# Patient Record
Sex: FEMALE | Race: WHITE | Employment: UNEMPLOYED | URBAN - METROPOLITAN AREA
[De-identification: names, ages, dates, MRNs, and addresses within clinical notes are randomized per-mention and may not be internally consistent; named-entity substitution may affect disease eponyms.]

---

## 2018-10-26 ENCOUNTER — HOSPITAL ENCOUNTER (OUTPATIENT)
Dept: MRI IMAGING | Age: 68
Discharge: HOME | End: 2018-10-26
Attending: ORTHOPAEDIC SURGERY
Payer: MEDICARE

## 2018-10-26 ENCOUNTER — HOSPITAL ENCOUNTER (OUTPATIENT)
Dept: GENERAL RADIOLOGY | Age: 68
Discharge: HOME | End: 2018-10-26
Attending: ORTHOPAEDIC SURGERY
Payer: MEDICARE

## 2018-10-26 DIAGNOSIS — M25.561 RIGHT KNEE PAIN: ICD-10-CM

## 2018-10-26 PROCEDURE — 73721 MRI JNT OF LWR EXTRE W/O DYE: CPT

## 2018-10-26 PROCEDURE — 73501 X-RAY EXAM HIP UNI 1 VIEW: CPT

## 2018-10-26 PROCEDURE — 73600 X-RAY EXAM OF ANKLE: CPT

## 2018-10-26 PROCEDURE — 73560 X-RAY EXAM OF KNEE 1 OR 2: CPT

## 2018-11-13 ENCOUNTER — HOSPITAL ENCOUNTER (OUTPATIENT)
Dept: PREADMISSION TESTING | Age: 68
Discharge: HOME | End: 2018-11-13
Payer: MEDICARE

## 2018-11-13 VITALS — HEIGHT: 64 IN | WEIGHT: 165 LBS | BODY MASS INDEX: 28.17 KG/M2

## 2018-11-13 LAB
ATRIAL RATE: 71 BPM
BACTERIA SPEC CULT: NORMAL
CALCULATED P AXIS, ECG09: 22 DEGREES
CALCULATED R AXIS, ECG10: 25 DEGREES
CALCULATED T AXIS, ECG11: 51 DEGREES
DIAGNOSIS, 93000: NORMAL
ERYTHROCYTE [DISTWIDTH] IN BLOOD BY AUTOMATED COUNT: 12.7 % (ref 11.6–14.5)
HCT VFR BLD AUTO: 37.3 % (ref 35–45)
HGB BLD-MCNC: 12.5 G/DL (ref 12–16)
MCH RBC QN AUTO: 32.8 PG (ref 24–34)
MCHC RBC AUTO-ENTMCNC: 33.5 G/DL (ref 31–37)
MCV RBC AUTO: 97.9 FL (ref 74–97)
P-R INTERVAL, ECG05: 158 MS
PLATELET # BLD AUTO: 180 K/UL (ref 135–420)
PMV BLD AUTO: 9.6 FL (ref 9.2–11.8)
Q-T INTERVAL, ECG07: 414 MS
QRS DURATION, ECG06: 74 MS
QTC CALCULATION (BEZET), ECG08: 449 MS
RBC # BLD AUTO: 3.81 M/UL (ref 4.2–5.3)
SERVICE CMNT-IMP: NORMAL
VENTRICULAR RATE, ECG03: 71 BPM
WBC # BLD AUTO: 5.6 K/UL (ref 4.6–13.2)

## 2018-11-13 PROCEDURE — 85027 COMPLETE CBC AUTOMATED: CPT

## 2018-11-13 PROCEDURE — 93005 ELECTROCARDIOGRAM TRACING: CPT

## 2018-11-13 PROCEDURE — 87641 MR-STAPH DNA AMP PROBE: CPT

## 2018-11-13 RX ORDER — ACETAMINOPHEN 500 MG
1000 TABLET ORAL 4 TIMES DAILY
COMMUNITY
End: 2018-11-28

## 2018-11-13 NOTE — PERIOP NOTES
No sleep apnea, removable prosthetic devices or family history of malignant hyperthermia. Care fusion kit and instructions given and reviewed. PCP is aware of the surgery. No participation in clinical trial or research study. Meets criteria for special population- HX DVT. Posting notified. Unable to attend Joint Replacement class- takes care of  who cannot be left alone.

## 2018-11-14 PROBLEM — M17.11 PRIMARY OSTEOARTHRITIS OF RIGHT KNEE: Status: ACTIVE | Noted: 2018-11-14

## 2018-11-14 RX ORDER — CEFAZOLIN SODIUM/WATER 2 G/20 ML
2 SYRINGE (ML) INTRAVENOUS ONCE
Status: CANCELLED | OUTPATIENT
Start: 2018-11-14 | End: 2018-11-14

## 2018-11-14 NOTE — H&P
Patient Name:  Elham Navarro YOB: 1950 Chief Complaint:  Right-sided knee pain. History of Chief Complaint:  Ms. Isra De Paz is a 76 y.o female being seen for right-sided knee pain. She underwent a corticosteroid injection on 06/18/18. She states that the injection seemed to help for a few days only. She is now having more right knee pain and locking. She is also having left knee discomfort. She states her right knee grabs at times and tries to lock up when she is walking. It does not matter whether she is walking at a fast pace or slowly. She states she almost falls at times due to the locking in her right knee. She has difficulty climbing the stairs due to her knee pain. She is unable to lead with her right leg when climbing the stairs due to the pain. She says the right knee is still giving her problems, and she is still having pain. She has difficulty with standing, walking, turning, and twisting. She says she has been cautious. She has had Orthovisc injections and is really not making any progress. Past Medical/Surgical History:   
Disease/Disorder Type Date Side Surgery Date Side Comment Osteoarthritis High cholesterol Depression Finger surgery  right Blood clots Anxiety Arthroscopy shoulder 09/01/2010 right Arthroscopy shoulder 09/17/2012 left Tonsillectomy 1963 Spinal fusion, cervical 1980  CELIA 09/13/2016 -C6-7 Hysterectomy 1999 Trigger finger surgery 2006 left Allergies:   
Ingredient Reaction Medication Name Comment NO KNOWN ALLERGIES Current Medications:   
Medication Directions Aleve 220 mg capsule take 440mg orally as needed Lipitor 40 mg tablet take 40mg orally daily  
lorazepam 1 mg tablet take 1 tablet by oral route  as needed Tylenol Arthritis Pain 650 mg tablet,extended release  as needed  
sertraline 100 mg tablet take 1 tablet by oral route 2 times daily Voltaren 1 % topical gel apply (2G) for small joint and (4G) large joint by topical route 4 times every day to the affected area(s)      100gram tubes Social History: SMOKING Status Tobacco Type Units Per Day Yrs Used Never smoker ALCOHOL There is a history of alcohol use. Type: Beer. consumed weekly. Family History:   
Disease Detail Family Member Age Cause of Death Comments Family history of Asthma   N Arthritis Sister  N Bleeding tendencies Brother  N Cancer, unknown Sister  N   
Cardiovascular disease Father  N Hypertension Father  N   
Cardiovascular disease Brother  N Hypertension Brother  N Seizure disorder Brother  N Stroke Father  N Myocardial infarction Father  Y Review of Systems:    Pertinent positives include joint stiffness. Pertinent negatives include fever and fainting. Vitals: 
Date BP Pulse Temp (F) Resp. (per min.) Height (Total in.) Weight (lbs.) BMI  
11/16/2016 166/95        
11/16/2016 190/100 72   64.00  26.95  
09/08/2016     64.50  25.69 Physical Examination:   
General:  The patient appears healthy. Cardiovascular:  Arterial pulses are normal. 
Skin:  The skin is normal appearing with no contusions or wounds noted. Heart- RRR Lungs-CTA garth Abdomen- +BS,soft,nontender Musculoskeletal:  The right knee appears normal and has no effusion. There is patellofemoral crepitus and tenderness around the medial tibial joint line. Otherwise, the knee demonstrates normal movement and no medial or lateral instability. The quadriceps tendon of the leg is not tender on palpation. The knee has no anterior or posterior drawer signs. Results of the Reinier and apprehension tests of the knee are negative. Neurological:  There is no weakness noted in the lower extremities, hips, knees, or ankles. No muscle atrophy is seen.   Reflexes and peripheral nerves are normal.     
 
 Radiograph Examination: Review of her MRI scan of the right knee [facility] 10/29/18 reveals severe patellofemoral osteoarthritis and tibial osteoarthritis. Impression:   Ms. Alexander Aguilar has knee pain and osteoarthritis. Plan:  We discussed treatments for the condition including surgical intervention, the risks, and benefits as well as the different surgical approaches and decision making. We also discussed nonsurgical care for this condition including medications, injections, physical therapy, rehabilitation, activity modification, and brace utilization. At this point, having failed conservative care, she would like to proceed with operative intervention. We will plan for right total knee arthroplasty . The risks and benefits include infection, bleeding, deep venous thrombosis, pulmonary embolism, heart attack, stroke, death, arthrofibrosis, need for manipulation, neurovascular compromise, need for revision surgical intervention, persistent long-term pain, need for chronic pain management, and metallic allergies.

## 2018-11-25 ENCOUNTER — ANESTHESIA EVENT (OUTPATIENT)
Dept: SURGERY | Age: 68
DRG: 470 | End: 2018-11-25
Payer: MEDICARE

## 2018-11-25 NOTE — ANESTHESIA PREPROCEDURE EVALUATION
Anesthetic History Review of Systems / Medical History Patient summary reviewed, nursing notes reviewed and pertinent labs reviewed Pulmonary Within defined limits Neuro/Psych Within defined limits Psychiatric history Cardiovascular Within defined limits Exercise tolerance: >4 METS 
  
GI/Hepatic/Renal 
Within defined limits Endo/Other Arthritis Other Findings Physical Exam 
 
Airway Mallampati: II 
TM Distance: 4 - 6 cm Neck ROM: decreased range of motion Mouth opening: Normal 
 
 Cardiovascular Rhythm: regular Rate: normal 
 
 
 
 Dental 
No notable dental hx Pulmonary Abdominal 
GI exam deferred Other Findings Anesthetic Plan ASA: 2 Anesthesia type: general and regional - femoral single shot Post-op pain plan if not by surgeon: peripheral nerve block single Induction: Intravenous Anesthetic plan and risks discussed with: Patient Risk of a block include nerve injury, bleeding, infection, and failure as the most common ones although they rare.

## 2018-11-26 ENCOUNTER — HOSPITAL ENCOUNTER (INPATIENT)
Age: 68
LOS: 2 days | Discharge: HOME HEALTH | DRG: 470 | End: 2018-11-28
Attending: ORTHOPAEDIC SURGERY | Admitting: ORTHOPAEDIC SURGERY
Payer: MEDICARE

## 2018-11-26 ENCOUNTER — ANESTHESIA (OUTPATIENT)
Dept: SURGERY | Age: 68
DRG: 470 | End: 2018-11-26
Payer: MEDICARE

## 2018-11-26 DIAGNOSIS — M17.11 PRIMARY OSTEOARTHRITIS OF RIGHT KNEE: Primary | ICD-10-CM

## 2018-11-26 LAB
ABO + RH BLD: NORMAL
BLOOD GROUP ANTIBODIES SERPL: NORMAL
SPECIMEN EXP DATE BLD: NORMAL

## 2018-11-26 PROCEDURE — 74011250636 HC RX REV CODE- 250/636

## 2018-11-26 PROCEDURE — 77030016060 HC NDL NRV BLK TELE -A: Performed by: ANESTHESIOLOGY

## 2018-11-26 PROCEDURE — 0SRC0J9 REPLACEMENT OF RIGHT KNEE JOINT WITH SYNTHETIC SUBSTITUTE, CEMENTED, OPEN APPROACH: ICD-10-PCS | Performed by: ORTHOPAEDIC SURGERY

## 2018-11-26 PROCEDURE — L1830 KO IMMOB CANVAS LONG PRE OTS: HCPCS | Performed by: ORTHOPAEDIC SURGERY

## 2018-11-26 PROCEDURE — 86901 BLOOD TYPING SEROLOGIC RH(D): CPT

## 2018-11-26 PROCEDURE — 74011250636 HC RX REV CODE- 250/636: Performed by: ORTHOPAEDIC SURGERY

## 2018-11-26 PROCEDURE — 97162 PT EVAL MOD COMPLEX 30 MIN: CPT

## 2018-11-26 PROCEDURE — 77030038010: Performed by: ORTHOPAEDIC SURGERY

## 2018-11-26 PROCEDURE — 65270000029 HC RM PRIVATE

## 2018-11-26 PROCEDURE — 77030010785: Performed by: ORTHOPAEDIC SURGERY

## 2018-11-26 PROCEDURE — 3E0T3BZ INTRODUCTION OF ANESTHETIC AGENT INTO PERIPHERAL NERVES AND PLEXI, PERCUTANEOUS APPROACH: ICD-10-PCS | Performed by: ANESTHESIOLOGY

## 2018-11-26 PROCEDURE — 77030012406 HC DRN WND PENRS BARD -A: Performed by: ORTHOPAEDIC SURGERY

## 2018-11-26 PROCEDURE — 76942 ECHO GUIDE FOR BIOPSY: CPT | Performed by: ORTHOPAEDIC SURGERY

## 2018-11-26 PROCEDURE — C1713 ANCHOR/SCREW BN/BN,TIS/BN: HCPCS | Performed by: ORTHOPAEDIC SURGERY

## 2018-11-26 PROCEDURE — 77030032489 HC SLV COMPR SCD FT CUF COVD -B: Performed by: ORTHOPAEDIC SURGERY

## 2018-11-26 PROCEDURE — 77030020813 HC INST SCULP CEM KT DISP S&N -B: Performed by: ORTHOPAEDIC SURGERY

## 2018-11-26 PROCEDURE — 77030008462 HC STPLR SKN PROX J&J -A: Performed by: ORTHOPAEDIC SURGERY

## 2018-11-26 PROCEDURE — 76060000034 HC ANESTHESIA 1.5 TO 2 HR: Performed by: ORTHOPAEDIC SURGERY

## 2018-11-26 PROCEDURE — 64447 NJX AA&/STRD FEMORAL NRV IMG: CPT | Performed by: ANESTHESIOLOGY

## 2018-11-26 PROCEDURE — 77030027138 HC INCENT SPIROMETER -A: Performed by: ORTHOPAEDIC SURGERY

## 2018-11-26 PROCEDURE — 77030018836 HC SOL IRR NACL ICUM -A: Performed by: ORTHOPAEDIC SURGERY

## 2018-11-26 PROCEDURE — 77030013708 HC HNDPC SUC IRR PULS STRY –B: Performed by: ORTHOPAEDIC SURGERY

## 2018-11-26 PROCEDURE — 74011250636 HC RX REV CODE- 250/636: Performed by: ANESTHESIOLOGY

## 2018-11-26 PROCEDURE — 74011000250 HC RX REV CODE- 250: Performed by: ORTHOPAEDIC SURGERY

## 2018-11-26 PROCEDURE — 74011250637 HC RX REV CODE- 250/637: Performed by: ANESTHESIOLOGY

## 2018-11-26 PROCEDURE — 77030020268 HC MISC GENERAL SUPPLY: Performed by: ORTHOPAEDIC SURGERY

## 2018-11-26 PROCEDURE — 77030020782 HC GWN BAIR PAWS FLX 3M -B: Performed by: ORTHOPAEDIC SURGERY

## 2018-11-26 PROCEDURE — C1776 JOINT DEVICE (IMPLANTABLE): HCPCS | Performed by: ORTHOPAEDIC SURGERY

## 2018-11-26 PROCEDURE — 36415 COLL VENOUS BLD VENIPUNCTURE: CPT

## 2018-11-26 PROCEDURE — 77030003028 HC SUT VCRL J&J -A: Performed by: ORTHOPAEDIC SURGERY

## 2018-11-26 PROCEDURE — 76210000017 HC OR PH I REC 1.5 TO 2 HR: Performed by: ORTHOPAEDIC SURGERY

## 2018-11-26 PROCEDURE — 77030012893

## 2018-11-26 PROCEDURE — 97530 THERAPEUTIC ACTIVITIES: CPT

## 2018-11-26 PROCEDURE — 74011000250 HC RX REV CODE- 250: Performed by: PHYSICIAN ASSISTANT

## 2018-11-26 PROCEDURE — 74011000258 HC RX REV CODE- 258: Performed by: PHYSICIAN ASSISTANT

## 2018-11-26 PROCEDURE — 97116 GAIT TRAINING THERAPY: CPT

## 2018-11-26 PROCEDURE — 76010000153 HC OR TIME 1.5 TO 2 HR: Performed by: ORTHOPAEDIC SURGERY

## 2018-11-26 PROCEDURE — 77030028925 HC PIN/DRL SET HUM S&N -C: Performed by: ORTHOPAEDIC SURGERY

## 2018-11-26 PROCEDURE — 74011250637 HC RX REV CODE- 250/637: Performed by: PHYSICIAN ASSISTANT

## 2018-11-26 PROCEDURE — 77030000032 HC CUF TRNQT ZIMM -B: Performed by: ORTHOPAEDIC SURGERY

## 2018-11-26 PROCEDURE — 74011250636 HC RX REV CODE- 250/636: Performed by: PHYSICIAN ASSISTANT

## 2018-11-26 PROCEDURE — 74011000250 HC RX REV CODE- 250

## 2018-11-26 DEVICE — GENESIS II RESURFACING PATELLAR                                    PROSTHESIS  32MM
Type: IMPLANTABLE DEVICE | Site: KNEE | Status: FUNCTIONAL
Brand: GENESIS II

## 2018-11-26 DEVICE — GENESIS II OXINIUM FEMORAL SIZE 5 RIGHT
Type: IMPLANTABLE DEVICE | Site: KNEE | Status: FUNCTIONAL
Brand: GENESIS II

## 2018-11-26 DEVICE — LEGION HIGHLY CROSS LINKED                                    POLYETHYLENE DISHED INSERT SIZE 3-4 9MM
Type: IMPLANTABLE DEVICE | Site: KNEE | Status: FUNCTIONAL
Brand: LEGION

## 2018-11-26 DEVICE — CEMENT BNE 40GM FULL DOSE PMMA W/ GENT M VISC RADPQ FAST: Type: IMPLANTABLE DEVICE | Site: KNEE | Status: FUNCTIONAL

## 2018-11-26 DEVICE — GENESIS II NON-POROUS TIBIAL                                    BASEPLATE SIZE 3 RIGHT
Type: IMPLANTABLE DEVICE | Site: KNEE | Status: FUNCTIONAL
Brand: GENESIS II

## 2018-11-26 RX ORDER — MIDAZOLAM HYDROCHLORIDE 1 MG/ML
INJECTION, SOLUTION INTRAMUSCULAR; INTRAVENOUS
Status: COMPLETED | OUTPATIENT
Start: 2018-11-26 | End: 2018-11-26

## 2018-11-26 RX ORDER — ACETAMINOPHEN 325 MG/1
650 TABLET ORAL
Status: DISCONTINUED | OUTPATIENT
Start: 2018-11-26 | End: 2018-11-28 | Stop reason: HOSPADM

## 2018-11-26 RX ORDER — NALOXONE HYDROCHLORIDE 0.4 MG/ML
0.1 INJECTION, SOLUTION INTRAMUSCULAR; INTRAVENOUS; SUBCUTANEOUS AS NEEDED
Status: DISCONTINUED | OUTPATIENT
Start: 2018-11-26 | End: 2018-11-26 | Stop reason: HOSPADM

## 2018-11-26 RX ORDER — ATORVASTATIN CALCIUM 20 MG/1
40 TABLET, FILM COATED ORAL DAILY
Status: DISCONTINUED | OUTPATIENT
Start: 2018-11-27 | End: 2018-11-28 | Stop reason: HOSPADM

## 2018-11-26 RX ORDER — ONDANSETRON 4 MG/1
4 TABLET, ORALLY DISINTEGRATING ORAL
Status: DISCONTINUED | OUTPATIENT
Start: 2018-11-26 | End: 2018-11-28

## 2018-11-26 RX ORDER — LORAZEPAM 1 MG/1
1 TABLET ORAL AS NEEDED
Status: DISCONTINUED | OUTPATIENT
Start: 2018-11-26 | End: 2018-11-28 | Stop reason: HOSPADM

## 2018-11-26 RX ORDER — DEXTROSE 50 % IN WATER (D50W) INTRAVENOUS SYRINGE
25-50 AS NEEDED
Status: DISCONTINUED | OUTPATIENT
Start: 2018-11-26 | End: 2018-11-26 | Stop reason: HOSPADM

## 2018-11-26 RX ORDER — SODIUM CHLORIDE, SODIUM LACTATE, POTASSIUM CHLORIDE, CALCIUM CHLORIDE 600; 310; 30; 20 MG/100ML; MG/100ML; MG/100ML; MG/100ML
1000 INJECTION, SOLUTION INTRAVENOUS CONTINUOUS
Status: DISCONTINUED | OUTPATIENT
Start: 2018-11-26 | End: 2018-11-26 | Stop reason: HOSPADM

## 2018-11-26 RX ORDER — PROPOFOL 10 MG/ML
INJECTION, EMULSION INTRAVENOUS AS NEEDED
Status: DISCONTINUED | OUTPATIENT
Start: 2018-11-26 | End: 2018-11-26 | Stop reason: HOSPADM

## 2018-11-26 RX ORDER — INSULIN LISPRO 100 [IU]/ML
INJECTION, SOLUTION INTRAVENOUS; SUBCUTANEOUS ONCE
Status: DISCONTINUED | OUTPATIENT
Start: 2018-11-26 | End: 2018-11-26 | Stop reason: HOSPADM

## 2018-11-26 RX ORDER — KETOROLAC TROMETHAMINE 30 MG/ML
15 INJECTION, SOLUTION INTRAMUSCULAR; INTRAVENOUS
Status: DISPENSED | OUTPATIENT
Start: 2018-11-26 | End: 2018-11-27

## 2018-11-26 RX ORDER — HYDROMORPHONE HYDROCHLORIDE 2 MG/ML
0.5 INJECTION, SOLUTION INTRAMUSCULAR; INTRAVENOUS; SUBCUTANEOUS
Status: COMPLETED | OUTPATIENT
Start: 2018-11-26 | End: 2018-11-26

## 2018-11-26 RX ORDER — ONDANSETRON 2 MG/ML
INJECTION INTRAMUSCULAR; INTRAVENOUS AS NEEDED
Status: DISCONTINUED | OUTPATIENT
Start: 2018-11-26 | End: 2018-11-26 | Stop reason: HOSPADM

## 2018-11-26 RX ORDER — SODIUM CHLORIDE 0.9 % (FLUSH) 0.9 %
5-10 SYRINGE (ML) INJECTION AS NEEDED
Status: DISCONTINUED | OUTPATIENT
Start: 2018-11-26 | End: 2018-11-28 | Stop reason: HOSPADM

## 2018-11-26 RX ORDER — KETAMINE HYDROCHLORIDE 10 MG/ML
INJECTION, SOLUTION INTRAMUSCULAR; INTRAVENOUS AS NEEDED
Status: DISCONTINUED | OUTPATIENT
Start: 2018-11-26 | End: 2018-11-26 | Stop reason: HOSPADM

## 2018-11-26 RX ORDER — LIDOCAINE HYDROCHLORIDE 20 MG/ML
INJECTION, SOLUTION EPIDURAL; INFILTRATION; INTRACAUDAL; PERINEURAL AS NEEDED
Status: DISCONTINUED | OUTPATIENT
Start: 2018-11-26 | End: 2018-11-26 | Stop reason: HOSPADM

## 2018-11-26 RX ORDER — ZOLPIDEM TARTRATE 5 MG/1
5 TABLET ORAL
Status: DISCONTINUED | OUTPATIENT
Start: 2018-11-26 | End: 2018-11-28 | Stop reason: HOSPADM

## 2018-11-26 RX ORDER — CEFAZOLIN SODIUM/WATER 2 G/20 ML
2 SYRINGE (ML) INTRAVENOUS EVERY 8 HOURS
Status: COMPLETED | OUTPATIENT
Start: 2018-11-26 | End: 2018-11-27

## 2018-11-26 RX ORDER — DOCUSATE SODIUM 100 MG/1
100 CAPSULE, LIQUID FILLED ORAL 2 TIMES DAILY
Status: DISCONTINUED | OUTPATIENT
Start: 2018-11-26 | End: 2018-11-28 | Stop reason: HOSPADM

## 2018-11-26 RX ORDER — FENTANYL CITRATE 50 UG/ML
25 INJECTION, SOLUTION INTRAMUSCULAR; INTRAVENOUS
Status: DISCONTINUED | OUTPATIENT
Start: 2018-11-26 | End: 2018-11-26 | Stop reason: HOSPADM

## 2018-11-26 RX ORDER — SODIUM CHLORIDE, SODIUM LACTATE, POTASSIUM CHLORIDE, CALCIUM CHLORIDE 600; 310; 30; 20 MG/100ML; MG/100ML; MG/100ML; MG/100ML
125 INJECTION, SOLUTION INTRAVENOUS CONTINUOUS
Status: DISCONTINUED | OUTPATIENT
Start: 2018-11-26 | End: 2018-11-28 | Stop reason: HOSPADM

## 2018-11-26 RX ORDER — CELECOXIB 100 MG/1
200 CAPSULE ORAL ONCE
Status: COMPLETED | OUTPATIENT
Start: 2018-11-26 | End: 2018-11-26

## 2018-11-26 RX ORDER — FLUMAZENIL 0.1 MG/ML
0.2 INJECTION INTRAVENOUS
Status: DISCONTINUED | OUTPATIENT
Start: 2018-11-26 | End: 2018-11-26 | Stop reason: HOSPADM

## 2018-11-26 RX ORDER — SODIUM CHLORIDE 0.9 % (FLUSH) 0.9 %
5-10 SYRINGE (ML) INJECTION AS NEEDED
Status: DISCONTINUED | OUTPATIENT
Start: 2018-11-26 | End: 2018-11-26 | Stop reason: HOSPADM

## 2018-11-26 RX ORDER — SERTRALINE HYDROCHLORIDE 50 MG/1
150 TABLET, FILM COATED ORAL DAILY
Status: DISCONTINUED | OUTPATIENT
Start: 2018-11-27 | End: 2018-11-28 | Stop reason: HOSPADM

## 2018-11-26 RX ORDER — FENTANYL CITRATE 50 UG/ML
INJECTION, SOLUTION INTRAMUSCULAR; INTRAVENOUS AS NEEDED
Status: DISCONTINUED | OUTPATIENT
Start: 2018-11-26 | End: 2018-11-26 | Stop reason: HOSPADM

## 2018-11-26 RX ORDER — MAGNESIUM SULFATE 100 %
4 CRYSTALS MISCELLANEOUS AS NEEDED
Status: DISCONTINUED | OUTPATIENT
Start: 2018-11-26 | End: 2018-11-26 | Stop reason: HOSPADM

## 2018-11-26 RX ORDER — DIPHENHYDRAMINE HCL 25 MG
25 CAPSULE ORAL
Status: DISCONTINUED | OUTPATIENT
Start: 2018-11-26 | End: 2018-11-28 | Stop reason: HOSPADM

## 2018-11-26 RX ORDER — LANOLIN ALCOHOL/MO/W.PET/CERES
1 CREAM (GRAM) TOPICAL 2 TIMES DAILY WITH MEALS
Status: DISCONTINUED | OUTPATIENT
Start: 2018-11-26 | End: 2018-11-28 | Stop reason: HOSPADM

## 2018-11-26 RX ORDER — CEFAZOLIN SODIUM/WATER 2 G/20 ML
2 SYRINGE (ML) INTRAVENOUS ONCE
Status: COMPLETED | OUTPATIENT
Start: 2018-11-26 | End: 2018-11-26

## 2018-11-26 RX ORDER — FENTANYL CITRATE 50 UG/ML
INJECTION, SOLUTION INTRAMUSCULAR; INTRAVENOUS
Status: DISPENSED
Start: 2018-11-26 | End: 2018-11-26

## 2018-11-26 RX ORDER — ROPIVACAINE HYDROCHLORIDE 5 MG/ML
INJECTION, SOLUTION EPIDURAL; INFILTRATION; PERINEURAL
Status: COMPLETED | OUTPATIENT
Start: 2018-11-26 | End: 2018-11-26

## 2018-11-26 RX ORDER — OXYCODONE AND ACETAMINOPHEN 10; 325 MG/1; MG/1
1 TABLET ORAL
Status: DISCONTINUED | OUTPATIENT
Start: 2018-11-26 | End: 2018-11-27

## 2018-11-26 RX ORDER — OXYCODONE AND ACETAMINOPHEN 5; 325 MG/1; MG/1
1 TABLET ORAL
Status: DISCONTINUED | OUTPATIENT
Start: 2018-11-26 | End: 2018-11-27

## 2018-11-26 RX ORDER — LUBIPROSTONE 24 UG/1
24 CAPSULE, GELATIN COATED ORAL 2 TIMES DAILY WITH MEALS
Status: DISCONTINUED | OUTPATIENT
Start: 2018-11-26 | End: 2018-11-28 | Stop reason: HOSPADM

## 2018-11-26 RX ORDER — DEXTROSE, SODIUM CHLORIDE, SODIUM LACTATE, POTASSIUM CHLORIDE, AND CALCIUM CHLORIDE 5; .6; .31; .03; .02 G/100ML; G/100ML; G/100ML; G/100ML; G/100ML
125 INJECTION, SOLUTION INTRAVENOUS CONTINUOUS
Status: DISPENSED | OUTPATIENT
Start: 2018-11-26 | End: 2018-11-28

## 2018-11-26 RX ORDER — ACETAMINOPHEN 10 MG/ML
1000 INJECTION, SOLUTION INTRAVENOUS ONCE
Status: COMPLETED | OUTPATIENT
Start: 2018-11-26 | End: 2018-11-26

## 2018-11-26 RX ORDER — ENOXAPARIN SODIUM 100 MG/ML
30 INJECTION SUBCUTANEOUS EVERY 12 HOURS
Status: DISCONTINUED | OUTPATIENT
Start: 2018-11-27 | End: 2018-11-28 | Stop reason: HOSPADM

## 2018-11-26 RX ORDER — SODIUM CHLORIDE 0.9 % (FLUSH) 0.9 %
5-10 SYRINGE (ML) INJECTION EVERY 8 HOURS
Status: DISCONTINUED | OUTPATIENT
Start: 2018-11-26 | End: 2018-11-28 | Stop reason: HOSPADM

## 2018-11-26 RX ORDER — DEXAMETHASONE SODIUM PHOSPHATE 4 MG/ML
INJECTION, SOLUTION INTRA-ARTICULAR; INTRALESIONAL; INTRAMUSCULAR; INTRAVENOUS; SOFT TISSUE AS NEEDED
Status: DISCONTINUED | OUTPATIENT
Start: 2018-11-26 | End: 2018-11-26 | Stop reason: HOSPADM

## 2018-11-26 RX ORDER — NALOXONE HYDROCHLORIDE 0.4 MG/ML
0.4 INJECTION, SOLUTION INTRAMUSCULAR; INTRAVENOUS; SUBCUTANEOUS AS NEEDED
Status: DISCONTINUED | OUTPATIENT
Start: 2018-11-26 | End: 2018-11-28 | Stop reason: HOSPADM

## 2018-11-26 RX ADMIN — FERROUS SULFATE TAB 325 MG (65 MG ELEMENTAL FE) 325 MG: 325 (65 FE) TAB at 16:07

## 2018-11-26 RX ADMIN — SODIUM CHLORIDE, SODIUM LACTATE, POTASSIUM CHLORIDE, AND CALCIUM CHLORIDE: 600; 310; 30; 20 INJECTION, SOLUTION INTRAVENOUS at 09:00

## 2018-11-26 RX ADMIN — SODIUM CHLORIDE, SODIUM LACTATE, POTASSIUM CHLORIDE, CALCIUM CHLORIDE, AND DEXTROSE MONOHYDRATE 125 ML/HR: 600; 310; 30; 20; 5 INJECTION, SOLUTION INTRAVENOUS at 12:15

## 2018-11-26 RX ADMIN — HYDROMORPHONE HYDROCHLORIDE 0.5 MG: 2 INJECTION INTRAMUSCULAR; INTRAVENOUS; SUBCUTANEOUS at 10:26

## 2018-11-26 RX ADMIN — KETAMINE HYDROCHLORIDE 10 MG: 10 INJECTION, SOLUTION INTRAMUSCULAR; INTRAVENOUS at 08:40

## 2018-11-26 RX ADMIN — Medication 2 G: at 08:43

## 2018-11-26 RX ADMIN — Medication 2 G: at 16:07

## 2018-11-26 RX ADMIN — OXYCODONE AND ACETAMINOPHEN 1 TABLET: 10; 325 TABLET ORAL at 16:06

## 2018-11-26 RX ADMIN — FENTANYL CITRATE 25 MCG: 50 INJECTION, SOLUTION INTRAMUSCULAR; INTRAVENOUS at 09:00

## 2018-11-26 RX ADMIN — ONDANSETRON 4 MG: 2 INJECTION INTRAMUSCULAR; INTRAVENOUS at 08:15

## 2018-11-26 RX ADMIN — SODIUM CHLORIDE, SODIUM LACTATE, POTASSIUM CHLORIDE, AND CALCIUM CHLORIDE 125 ML/HR: 600; 310; 30; 20 INJECTION, SOLUTION INTRAVENOUS at 06:32

## 2018-11-26 RX ADMIN — KETAMINE HYDROCHLORIDE 15 MG: 10 INJECTION, SOLUTION INTRAMUSCULAR; INTRAVENOUS at 08:45

## 2018-11-26 RX ADMIN — PROPOFOL 150 MG: 10 INJECTION, EMULSION INTRAVENOUS at 08:03

## 2018-11-26 RX ADMIN — KETAMINE HYDROCHLORIDE 15 MG: 10 INJECTION, SOLUTION INTRAMUSCULAR; INTRAVENOUS at 08:34

## 2018-11-26 RX ADMIN — TRANEXAMIC ACID 1 G: 100 INJECTION, SOLUTION INTRAVENOUS at 08:19

## 2018-11-26 RX ADMIN — MIDAZOLAM HYDROCHLORIDE 2 MG: 1 INJECTION, SOLUTION INTRAMUSCULAR; INTRAVENOUS at 07:50

## 2018-11-26 RX ADMIN — KETOROLAC TROMETHAMINE 15 MG: 30 INJECTION, SOLUTION INTRAMUSCULAR at 14:16

## 2018-11-26 RX ADMIN — HYDROMORPHONE HYDROCHLORIDE 0.5 MG: 2 INJECTION INTRAMUSCULAR; INTRAVENOUS; SUBCUTANEOUS at 10:44

## 2018-11-26 RX ADMIN — DOCUSATE SODIUM 100 MG: 100 CAPSULE, LIQUID FILLED ORAL at 20:03

## 2018-11-26 RX ADMIN — Medication 10 ML: at 22:48

## 2018-11-26 RX ADMIN — FENTANYL CITRATE 50 MCG: 50 INJECTION, SOLUTION INTRAMUSCULAR; INTRAVENOUS at 08:53

## 2018-11-26 RX ADMIN — LIDOCAINE HYDROCHLORIDE 60 MG: 20 INJECTION, SOLUTION EPIDURAL; INFILTRATION; INTRACAUDAL; PERINEURAL at 08:03

## 2018-11-26 RX ADMIN — KETOROLAC TROMETHAMINE 15 MG: 30 INJECTION, SOLUTION INTRAMUSCULAR at 22:47

## 2018-11-26 RX ADMIN — FENTANYL CITRATE 25 MCG: 50 INJECTION, SOLUTION INTRAMUSCULAR; INTRAVENOUS at 11:24

## 2018-11-26 RX ADMIN — CELECOXIB 200 MG: 100 CAPSULE ORAL at 07:46

## 2018-11-26 RX ADMIN — PROPOFOL 20 MG: 10 INJECTION, EMULSION INTRAVENOUS at 09:30

## 2018-11-26 RX ADMIN — SODIUM CHLORIDE, SODIUM LACTATE, POTASSIUM CHLORIDE, AND CALCIUM CHLORIDE: 600; 310; 30; 20 INJECTION, SOLUTION INTRAVENOUS at 07:59

## 2018-11-26 RX ADMIN — OXYCODONE AND ACETAMINOPHEN 1 TABLET: 10; 325 TABLET ORAL at 12:23

## 2018-11-26 RX ADMIN — PROPOFOL 20 MG: 10 INJECTION, EMULSION INTRAVENOUS at 09:25

## 2018-11-26 RX ADMIN — DOCUSATE SODIUM 100 MG: 100 CAPSULE, LIQUID FILLED ORAL at 14:16

## 2018-11-26 RX ADMIN — PROPOFOL 20 MG: 10 INJECTION, EMULSION INTRAVENOUS at 09:00

## 2018-11-26 RX ADMIN — HYDROMORPHONE HYDROCHLORIDE 0.5 MG: 2 INJECTION INTRAMUSCULAR; INTRAVENOUS; SUBCUTANEOUS at 10:16

## 2018-11-26 RX ADMIN — FENTANYL CITRATE 25 MCG: 50 INJECTION, SOLUTION INTRAMUSCULAR; INTRAVENOUS at 11:17

## 2018-11-26 RX ADMIN — LUBIPROSTONE 24 MCG: 24 CAPSULE, GELATIN COATED ORAL at 16:06

## 2018-11-26 RX ADMIN — ROPIVACAINE HYDROCHLORIDE 20 ML: 5 INJECTION, SOLUTION EPIDURAL; INFILTRATION; PERINEURAL at 07:50

## 2018-11-26 RX ADMIN — HYDROMORPHONE HYDROCHLORIDE 0.5 MG: 2 INJECTION INTRAMUSCULAR; INTRAVENOUS; SUBCUTANEOUS at 10:56

## 2018-11-26 RX ADMIN — DEXAMETHASONE SODIUM PHOSPHATE 4 MG: 4 INJECTION, SOLUTION INTRA-ARTICULAR; INTRALESIONAL; INTRAMUSCULAR; INTRAVENOUS; SOFT TISSUE at 08:15

## 2018-11-26 RX ADMIN — PROPOFOL 50 MG: 10 INJECTION, EMULSION INTRAVENOUS at 08:47

## 2018-11-26 RX ADMIN — ACETAMINOPHEN 1000 MG: 10 INJECTION, SOLUTION INTRAVENOUS at 08:19

## 2018-11-26 RX ADMIN — SODIUM CHLORIDE, SODIUM LACTATE, POTASSIUM CHLORIDE, CALCIUM CHLORIDE, AND DEXTROSE MONOHYDRATE 125 ML/HR: 600; 310; 30; 20; 5 INJECTION, SOLUTION INTRAVENOUS at 20:06

## 2018-11-26 RX ADMIN — FENTANYL CITRATE 25 MCG: 50 INJECTION, SOLUTION INTRAMUSCULAR; INTRAVENOUS at 09:25

## 2018-11-26 RX ADMIN — OXYCODONE AND ACETAMINOPHEN 1 TABLET: 10; 325 TABLET ORAL at 20:03

## 2018-11-26 NOTE — PROGRESS NOTES
Problem: Mobility Impaired (Adult and Pediatric) Goal: *Acute Goals and Plan of Care (Insert Text) In 1-7 days pt will be able to perform: ST.  Bed mobility:  Rolling L to R to L modified independent for positioning. 2.  Supine to sit to supine S with HR for meals. 3.  Sit to stand to sit S with RW in prep for ambulation. LT.  Gait:  Ambulate >150ft S with RW, WBAT, for home/community mobility. 2.  Activity tolerance: Tolerate up in chair 1-2 hours for ADLs. 3.  Patient/Family Education:  Patient/family to be independent with HEP for follow-up care and safe discharge. physical Therapy EVALUATION Patient: Kym Miller (81 y.o. female) Date: 2018 Primary Diagnosis: RIGHT KNEE OSTEOARTHRITIS, RIGHT  KNEE PAIN, ANXIETY, ELEVATED CHOLESTEROL, DEPRESSION Osteoarthritis of right knee Procedure(s) (LRB): 
RIGHT TOTAL KNEE ARTHROPLASTY (Right) Day of Surgery Precautions:   Fall, WBAT 
 
ASSESSMENT : 
Based on the objective data described below, the patient presents with decreased functional mobility and independence in regard to bed mobility, transfers, gt quality and tolerance, R knee AROM, R knee strength, pain, stair negotiation and safety due to recent R TKA surgery. Pt rating pain in R knee 7/10 on numerical pain scale. Pt tearful upon mobility training due to pain. Pt required min A for supine<>sit<>stand. Pt able to participate in gt training w/ RW, WBAT, R KI, GB and min A w/ antalgic pattern. Pt able to void on [facility] and perform own hygiene. Pt returned to supine in bed w/ all needs within reach, SCDs applied and ice pack to R knee. Nurse Zachary King aware and family  
present. Recommend [facility] d/c. Pt will need RW for home. Patient will benefit from skilled intervention to address the above impairments. Patients rehabilitation potential is considered to be Good Factors which may influence rehabilitation potential include:  
[]         None noted []         Mental ability/status []         Medical condition 
[]         Home/family situation and support systems 
[]         Safety awareness [x]         Pain tolerance/management 
[]         Other: PLAN : 
Recommendations and Planned Interventions: 
[x]           Bed Mobility Training             []    Neuromuscular Re-Education 
[x]           Transfer Training                   []    Orthotic/Prosthetic Training 
[x]           Gait Training                          []    Modalities [x]           Therapeutic Exercises          []    Edema Management/Control 
[x]           Therapeutic Activities            [x]    Patient and Family Training/Education 
[]           Other (comment): Frequency/Duration: Patient will be followed by physical therapy 2-3 times per day/4-7 days per week to address goals. Discharge Recommendations: Home Health Further Equipment Recommendations for Discharge: rolling walker SUBJECTIVE:  
Patient stated I am hurting.  OBJECTIVE DATA SUMMARY:  
 
Past Medical History:  
Diagnosis Date  Chronic neck pain  Chronic pain   
 lumbar  Hypercholesterolemia  OA (osteoarthritis)  Psychiatric disorder   
 depression, anxiety  Thromboembolus ([facility] [address] 1990's  
 right leg Past Surgical History:  
Procedure Laterality Date [address] [facility]  HX CERVICAL FUSION    
 C6 & C7 anterior  HX GI    
 colonoscopy X2  
 HX HYSTERECTOMY  2000 [facility]  
 HX MOHS PROCEDURES  2009  
 right  HX ORTHOPAEDIC  2007 Left 4th finger trigger finger release  HX ROTATOR CUFF REPAIR Bilateral   
 HX SALPINGO-OOPHORECTOMY  2000 [facility] [address] [facility] Barriers to Learning/Limitations: None Compensate with: visual, verbal, tactile, kinesthetic cues/model Prior Level of Function/Home Situation:  
Home Situation Home Environment: Private residence # Steps to Enter: 0 One/Two Story Residence: Two story # of Interior Steps: 15 
 Lift Chair Available: Yes Living Alone: No 
Support Systems: Spouse/Significant Other/Partner Patient Expects to be Discharged to[de-identified] Private residence Current DME Used/Available at Home: Lift chairCritical Behavior: 
Neurologic State: Alert; Appropriate for age Orientation Level: Oriented X4 Cognition: Appropriate for age attention/concentration; Appropriate decision making; Appropriate safety awareness; Follows commands Safety/Judgement: Awareness of environment Psychosocial 
Patient Behaviors: Cooperative; Anxious; Tearful Family  Behaviors: Supportive;Calm; Appropriate for situation Purposeful Interaction: Yes Pt Identified Daily Priority: Clinical issues (comment) Caritas Process: Nurture loving kindness Caring Interventions: Reassure Reassure: Therapeutic listeningSkin Condition/Temp: Dry;Warm Family  Behaviors: Supportive;Calm; Appropriate for situation Skin Integrity: Incision (comment)(R knee) Skin Integumentary Skin Color: Appropriate for ethnicity Skin Condition/Temp: Dry;Warm 
Skin Integrity: Incision (comment)(R knee) Turgor: Non-tenting Hair Growth: Present Varicosities: Absent Strength:   
Strength: Generally decreased, functional 
Tone & Sensation:  
Tone: Normal 
Sensation: Intact Range Of Motion: 
AROM: Generally decreased, functional 
Functional Mobility: 
Bed Mobility: 
Supine to Sit: Minimum assistance; Additional time(vc) 
Sit to Supine: Minimum assistance; Additional time(vc) 
Scooting: Minimum assistance; Additional time(vc) 
Transfers: 
Sit to Stand: Minimum assistance; Additional time(vc) 
Stand to Sit: Minimum assistance; Additional time(vc) 
Balance:  
Sitting: Intact Standing: Intact; With supportAmbulation/Gait Training: 
Distance (ft): 4 Feet (ft) Assistive Device: Walker, rolling;Gait belt;Brace/Splint Ambulation - Level of Assistance: Minimal assistance(vc) 
Gait Abnormalities: Antalgic;Decreased step clearance; Step to gait Right Side Weight Bearing: As tolerated Base of Support: Shift to left Stance: Right decreased Speed/Heaven: Slow Step Length: Left shortened;Right shortened Swing Pattern: Left asymmetrical;Right asymmetrical 
Interventions: Safety awareness training; Tactile cues; Verbal cues; Visual/Demos Therapeutic Exercises: HEP written copy issued to pt per MD protocol. Pain: 
Pain Scale 1: Numeric (0 - 10) Pain Intensity 1: 7 Pain Location 1: Knee Pain Orientation 1: Posterior;Right Pain Description 1: Aching Pain Intervention(s) 1: Medication (see MAR) Activity Tolerance:  
Fair Please refer to the flowsheet for vital signs taken during this treatment. After treatment:  
[]         Patient left in no apparent distress sitting up in chair 
[x]         Patient left in no apparent distress in bed 
[x]         Call bell left within reach [x]         Nursing notified 
[]         Caregiver present 
[]         Bed alarm activated COMMUNICATION/EDUCATION:  
[x]         Fall prevention education was provided and the patient/caregiver indicated understanding. [x]         Patient/family have participated as able in goal setting and plan of care. [x]         Patient/family agree to work toward stated goals and plan of care. []         Patient understands intent and goals of therapy, but is neutral about his/her participation. []         Patient is unable to participate in goal setting and plan of care. Thank you for this referral. 
Michael Mathis, PT Time Calculation: 49 mins Eval Complexity: History: HIGH Complexity :3+ comorbidities / personal factors will impact the outcome/ POC Exam:MEDIUM Complexity : 3 Standardized tests and measures addressing body structure, function, activity limitation and / or participation in recreation  Presentation: LOW Complexity : Stable, uncomplicated  Clinical Decision Making:Medium Complexity amb <30' Overall Complexity:LOW  Mobility  Current  CJ= 20-39%   Goal  CI= 1-19%. The severity rating is based on the Level of Assistance required for Functional Mobility and ADLs.

## 2018-11-26 NOTE — PROGRESS NOTES
PT orders received and chart reviewed. Pt too drowsy to participate w/ PT at this time. Will attempt later as pt appropriate and schedule allows.

## 2018-11-26 NOTE — INTERVAL H&P NOTE
H&P Update: 
Devorah Flores was seen and examined. History and physical has been reviewed. The patient has been examined.  There have been no significant clinical changes since the completion of the originally dated History and Physical. 
 
Signed By: Misty Silva MD   
 November 26, 2018 7:00 AM

## 2018-11-26 NOTE — PERIOP NOTES
TRANSFER - OUT REPORT: 
 
Verbal report given to KHADIJAH Gmoez(name) on Delfino Le  being transferred to (unit) for routine post - op Report consisted of patients Situation, Background, Assessment and  
Recommendations(SBAR). Information from the following report(s) SBAR, Kardex, OR Summary, Procedure Summary, Intake/Output and MAR was reviewed with the receiving nurse. Lines:  
Peripheral IV 11/26/18 Right Hand (Active) Site Assessment Clean, dry, & intact 11/26/2018 10:30 AM  
Phlebitis Assessment 0 11/26/2018 10:30 AM  
Infiltration Assessment 0 11/26/2018 10:30 AM  
Dressing Status Clean, dry, & intact 11/26/2018 10:30 AM  
Dressing Type Transparent;Tape 11/26/2018 10:30 AM  
Hub Color/Line Status Green; Infusing 11/26/2018 10:30 AM  
Alcohol Cap Used No 11/26/2018  6:26 AM  
  
 
Opportunity for questions and clarification was provided. Patient transported with: 
 Registered Nurse Tech

## 2018-11-26 NOTE — PROGRESS NOTES
1203 Patient arrives to unit at this time. Dual skin assessment completed with Parul Granados RN, no abnormalities noted besides surgical incision to right knee. Admission assessment completed. Patient is A/O x 4. Lungs clear, radial pulses palpable, pedal pulses palpable, abdomen soft and nontender. Bowel sounds active in all 4 quadrants, 18 G IV to right hand intact and patent D5LR infusing. No signs of phlebitis or infiltration noted. TEDs on left leg and compression devices applied bilateral. Skin warm and dry  with elastic dressing and knee immobilizer to right knee clean dry and intact. Patient denies numbness/tingling & SOB. Pain 10/10 prn pain meds. Patient was oriented to the room to include use of call bell, meal ordering, and use of incentive spirometer, patient able to get incentive spirometer to 2500. Patient was given explanation of \" up for dinner\" program and has verbalized understanding. Phone and call bell left within reach. Non slip socks on. Plan of care for the day addressed with patient. Educated on pain medication availability and possible side effects as well as need to call for assistance before getting out of bed, patient verbalized understanding. Fall band on. Hemovac charged, patent, and draining. 1223 Patient stated pain was 10/10. PRN Percocet 10mg given. Patient educated on side effects and will monitor for relief. 1417 Patient stated pain was 9/10. PRN Tordol 15mg given. Patient educated on side effects and will monitor for relief. 1607 Patient stated pain was 7/10. PRN Percocet 10mg given. Patient educated on side effects and will monitor for relief. 2003 Patient stated pain was 8/10. PRN Percocet 10mg given. Patient educated on side effects and will monitor for relief. 2005 Bedside and Verbal shift change report given to [address]  by Abilio Hernandez RN.  Report included the following information SBAR, Kardex, Intake/Output, MAR and Recent Results. Pt in no distress,call bell within reach, and gripper socks on.

## 2018-11-26 NOTE — PERIOP NOTES
Patient transferred to room 204, [facility], 2 south via bed. NC at 2 LPM. PIV with IVF infusing per orders. Blood pressure (!) 169/107, pulse 66, temperature 98.3 °F (36.8 °C), resp. rate 16, height 5' 3.5\" (1.613 m), weight 73.6 kg (162 lb 5 oz), SpO2 99 %. Shayna LUCIO at bedside.

## 2018-11-26 NOTE — ROUTINE PROCESS
TRANSFER - IN REPORT: 
 
Verbal report received from [facility]) on Justa Frazier  being received from [employer]) for routine post - op Report consisted of patients Situation, Background, Assessment and  
Recommendations(SBAR). Information from the following report(s) SBAR, OR Summary, Procedure Summary, Intake/Output and MAR was reviewed with the receiving nurse. Opportunity for questions and clarification was provided. Assessment completed upon patients arrival to unit and care assumed. RN stated pt respirations were doing better.

## 2018-11-26 NOTE — ANESTHESIA POSTPROCEDURE EVALUATION
Post-Anesthesia Evaluation and Assessment Cardiovascular Function/Vital Signs Visit Vitals /77 Pulse 86 Temp 37.3 °C (99.1 °F) Resp 12 Ht 5' 3.5\" (1.613 m) Wt 73.6 kg (162 lb 5 oz) SpO2 95% BMI 28.30 kg/m² Patient is status post Procedure(s): RIGHT TOTAL KNEE ARTHROPLASTY. Nausea/Vomiting: Controlled. Postoperative hydration reviewed and adequate. Pain: 
Pain Scale 1: Visual (11/26/18 2533) Pain Intensity 1: 0 (11/26/18 3240) Managed. Neurological Status:  
Neuro (WDL): Within Defined Limits (11/26/18 0752) At baseline. Mental Status and Level of Consciousness: Arousable. Pulmonary Status:  
O2 Device: Nasal cannula (11/26/18 1027) Adequate oxygenation and airway patent. Complications related to anesthesia: None Post-anesthesia assessment completed. No concerns. Patient has met all discharge requirements. Signed By: Polo Millan MD  
 November 26, 2018 Procedure(s): RIGHT TOTAL KNEE ARTHROPLASTY. <BSHSIANPOST> Visit Vitals /77 Pulse 86 Temp 37.3 °C (99.1 °F) Resp 12 Ht 5' 3.5\" (1.613 m) Wt 73.6 kg (162 lb 5 oz) SpO2 95% BMI 28.30 kg/m²

## 2018-11-26 NOTE — OP NOTES
OPERATIVE NOTE    Patient: Delfino Le MRN: [id number]  SSN: [id number]    YOB: 1950  Age: 76 y.o. Sex: female      Indications: This is a 76y.o. year-old female who presents with right knee pain. X-rays have revealed significant OA. The patient was admitted for surgery as conservative measures have failed. Date of Procedure: 11/26/2018     Preoperative Diagnosis: RIGHT KNEE OSTEOARTHRITIS, RIGHT  KNEE PAIN, ANXIETY, ELEVATED CHOLESTEROL, DEPRESSION    Postoperative Diagnosis: RIGHT KNEE OSTEOARTHRITIS, RIGHT  KNEE PAIN, ANXIETY, ELEVATED CHOLESTEROL, DEPRESSION      Procedure: Procedure(s):  RIGHT TOTAL KNEE ARTHROPLASTY    Surgeon(s) and Role:     Benja Monroe MD - Primary    Anesthesia: @ORANEST    Estimated Blood Loss: 50cc    Tourniquet Time: 45min    Specimens: * No specimens in log *     Implants:   Implant Name Type Inv. Item Serial No.  Lot No. LRB No. Used Action   CEMENT BNE MED VISCO 40GM --  - [id number]  CEMENT BNE MED VISCO 40GM --   [employer] [id number] Right 2 Implanted   FEM OXIN NP JOSE II 5 RT --  - [id number]  FEM OXIN NP JOSE II 5 RT --   [place] [employer] [id number] Right 1 Implanted   BASEPLT FEM NP 3 RT -- JOSE II - [id number]  BASEPLT FEM NP 3 RT -- JOSE II  [employer] [id number] Right 1 Implanted   INSERT LGN XLPE [facility] SZ3-4 9MM --  - [id number]  INSERT LGN XLPE [facility] SZ3-4 9MM --   [employer] [id number] Right 1 Implanted   PAT BCNVX UHMWPE 32MM -- JOSE II - [id number]  PAT BCNVX UHMWPE 32MM -- JOSE II  [employer] [id number] Right 1 Implanted       Complications: None; patient tolerated the procedure well. Procedure: The patient was greeted by Anesthesia and taken to the operative suite, where the patient underwent general endotracheal anesthesia. Tourniquet was placed on the upper thigh. The leg was sterilely prepped and draped in a standard fashion.   The leg was exsanguinated with an Esmarch bandage and tourniquet was elevated to 350mmHg. An incision was carried out centered over the patella, extending two fingerbreadth's over the patella and to the level of the tibial tubercle. A medial parapatellar approach was utilized. The knee was taken into flexion. The medial and lateral meniscus, as well as the anterior cruciate ligament were resected. The posterior cruciate ligament was preserved. The [employer] custom femoral cutting block was placed, contacting the anterior femoral shaft and over the trochlear groove of the femur. This was an excellent fit. This was subsequently pinned and an oscillating saw was ultilized to create an anterior cut. The femur had previously sized to a size 5. A size 5 four-in-one cutting block was utilized to make the appropriate bone cuts. A size 5 femoral cruciate retaining trial was placed and found to be an excellent fit. The knee was taken into hyperflexion. Retractors were positioned to protect the soft tissue and neurovascular structures. The [employer] tibial cutting block was subsequently placed. There was excellent contact with the medial and lateral proximal tibial surface, as well as the anterior shaft of the tibia. It was subsequently pinned and an oscillating saw was utilized to create a proximal tibial cut. A size 3 tibial baseplate was found to be an excellent fit. A 9 mm trial polyethylene was placed and the knee was taken into extension, 10mm of the articular surface of the patella was resected with an oscillating saw and a 32 mm symmetric patella trial was placed. The knee was taken through range of motion. With a no-thumb technique, there was no subluxation or dislocation of the patella. The knee ranged from 0 degrees of extension to 125 degrees of flexion by gravity. There was no instability with varus valgus stress testing with a 9 mm polyethylene. The femur was drilled.   The tibia was punched. The tissues were irrigated with 1000ml of sterile saline with Bacitracin utilizing pulsatile lavage. Next, the [facility] oxinium size 5 cruciate retaining femur, a size 3 tibial baseplate, with a 9mm tibial insert and a 32 mm all polyethelene symmetric patella were placed with methylacrylate bonding. After all cement had hardened, the excess cement was removed,  the knee was taken through a range of motion from 0 degrees of extension to 125+ degrees of flexion by gravity. There was no instability throughout range of motion and the patella tracked without subluxation. The tissues were irrigated a second time with 500ml of sterile saline with Bacitracin utilizing pulsatile lavage. A drain was then placed. The capsule was re-approximated with figure-of-eight #1 Vicryl suture. The skin edges were re approximated with 2-0 Vicryl in a subcutaneous fashion and the skin was closed with staples  A soft sterile dressing,  Ace wrap and knee immobilizer were applied. .  The patient was transferred to the postanesthesia care unit in stable condition.

## 2018-11-26 NOTE — ANESTHESIA PROCEDURE NOTES
Peripheral Block Start time: 11/26/2018 7:50 AM 
Performed by: Ashely Carrasco MD 
Authorized by: Ashely Carrasco MD  
 
 
Pre-procedure: Indications: at surgeon's request and post-op pain management Preanesthetic Checklist: patient identified, risks and benefits discussed, site marked, timeout performed, anesthesia consent given and patient being monitored Timeout Time: 07:50 Block Type:  
Block Type:  Femoral single shot and adductor canal 
Laterality:  Right Monitoring:  Standard ASA monitoring, continuous pulse ox, frequent vital sign checks, heart rate, responsive to questions and oxygen Injection Technique:  Single shot Procedures: ultrasound guided Patient Position: supine Prep: chlorhexidine Location:  Mid thigh Needle Type:  Stimuplex Needle Gauge:  21 G Needle Localization:  Anatomical landmarks and ultrasound guidance Assessment: 
 
Injection Assessment:  Incremental injection every 5 mL, local visualized surrounding nerve on ultrasound, negative aspiration for blood, no paresthesia, no intravascular symptoms and ultrasound image on chart Patient tolerance:  Patient tolerated the procedure well with no immediate complications

## 2018-11-27 PROBLEM — M17.11 PRIMARY OSTEOARTHRITIS OF RIGHT KNEE: Status: RESOLVED | Noted: 2018-11-14 | Resolved: 2018-11-27

## 2018-11-27 PROBLEM — M17.11 OSTEOARTHRITIS OF RIGHT KNEE: Status: RESOLVED | Noted: 2018-11-26 | Resolved: 2018-11-27

## 2018-11-27 LAB
HCT VFR BLD AUTO: 32.6 % (ref 35–45)
HGB BLD-MCNC: 11 G/DL (ref 12–16)

## 2018-11-27 PROCEDURE — 74011250637 HC RX REV CODE- 250/637: Performed by: PHYSICIAN ASSISTANT

## 2018-11-27 PROCEDURE — 74011250636 HC RX REV CODE- 250/636: Performed by: ORTHOPAEDIC SURGERY

## 2018-11-27 PROCEDURE — 74011250637 HC RX REV CODE- 250/637: Performed by: ORTHOPAEDIC SURGERY

## 2018-11-27 PROCEDURE — 85014 HEMATOCRIT: CPT

## 2018-11-27 PROCEDURE — 97165 OT EVAL LOW COMPLEX 30 MIN: CPT

## 2018-11-27 PROCEDURE — 97530 THERAPEUTIC ACTIVITIES: CPT

## 2018-11-27 PROCEDURE — 74011250636 HC RX REV CODE- 250/636: Performed by: PHYSICIAN ASSISTANT

## 2018-11-27 PROCEDURE — 36415 COLL VENOUS BLD VENIPUNCTURE: CPT

## 2018-11-27 PROCEDURE — 97116 GAIT TRAINING THERAPY: CPT

## 2018-11-27 PROCEDURE — 65270000029 HC RM PRIVATE

## 2018-11-27 RX ORDER — KETOROLAC TROMETHAMINE 15 MG/ML
15 INJECTION, SOLUTION INTRAMUSCULAR; INTRAVENOUS EVERY 6 HOURS
Status: DISCONTINUED | OUTPATIENT
Start: 2018-11-27 | End: 2018-11-28 | Stop reason: HOSPADM

## 2018-11-27 RX ORDER — HYDROMORPHONE HYDROCHLORIDE 2 MG/1
2-4 TABLET ORAL
Status: DISCONTINUED | OUTPATIENT
Start: 2018-11-27 | End: 2018-11-28

## 2018-11-27 RX ORDER — NALOXONE HYDROCHLORIDE 4 MG/.1ML
SPRAY NASAL
Qty: 1 EACH | Refills: 0 | Status: SHIPPED | OUTPATIENT
Start: 2018-11-27

## 2018-11-27 RX ORDER — OXYCODONE AND ACETAMINOPHEN 5; 325 MG/1; MG/1
1-2 TABLET ORAL
Qty: 84 TAB | Refills: 0 | Status: SHIPPED | OUTPATIENT
Start: 2018-11-27 | End: 2018-11-28

## 2018-11-27 RX ORDER — ASPIRIN 325 MG
TABLET ORAL
Qty: 120 TAB | Refills: 1 | Status: SHIPPED | OUTPATIENT
Start: 2018-11-27

## 2018-11-27 RX ADMIN — KETOROLAC TROMETHAMINE 15 MG: 15 INJECTION, SOLUTION INTRAMUSCULAR; INTRAVENOUS at 22:10

## 2018-11-27 RX ADMIN — HYDROMORPHONE HYDROCHLORIDE 4 MG: 2 TABLET ORAL at 22:10

## 2018-11-27 RX ADMIN — ENOXAPARIN SODIUM 30 MG: 100 INJECTION SUBCUTANEOUS at 07:47

## 2018-11-27 RX ADMIN — SERTRALINE HYDROCHLORIDE 150 MG: 50 TABLET ORAL at 08:59

## 2018-11-27 RX ADMIN — ONDANSETRON 4 MG: 4 TABLET, ORALLY DISINTEGRATING ORAL at 13:31

## 2018-11-27 RX ADMIN — Medication 2 G: at 09:06

## 2018-11-27 RX ADMIN — ACETAMINOPHEN 650 MG: 325 TABLET ORAL at 03:48

## 2018-11-27 RX ADMIN — KETOROLAC TROMETHAMINE 15 MG: 30 INJECTION, SOLUTION INTRAMUSCULAR at 09:06

## 2018-11-27 RX ADMIN — ACETAMINOPHEN 650 MG: 325 TABLET ORAL at 22:14

## 2018-11-27 RX ADMIN — SODIUM CHLORIDE, SODIUM LACTATE, POTASSIUM CHLORIDE, CALCIUM CHLORIDE, AND DEXTROSE MONOHYDRATE 125 ML/HR: 600; 310; 30; 20; 5 INJECTION, SOLUTION INTRAVENOUS at 03:59

## 2018-11-27 RX ADMIN — OXYCODONE AND ACETAMINOPHEN 1 TABLET: 10; 325 TABLET ORAL at 08:59

## 2018-11-27 RX ADMIN — Medication 2 G: at 01:09

## 2018-11-27 RX ADMIN — FERROUS SULFATE TAB 325 MG (65 MG ELEMENTAL FE) 325 MG: 325 (65 FE) TAB at 07:47

## 2018-11-27 RX ADMIN — OXYCODONE AND ACETAMINOPHEN 1 TABLET: 10; 325 TABLET ORAL at 01:09

## 2018-11-27 RX ADMIN — LUBIPROSTONE 24 MCG: 24 CAPSULE, GELATIN COATED ORAL at 18:49

## 2018-11-27 RX ADMIN — DOCUSATE SODIUM 100 MG: 100 CAPSULE, LIQUID FILLED ORAL at 22:09

## 2018-11-27 RX ADMIN — OXYCODONE AND ACETAMINOPHEN 1 TABLET: 10; 325 TABLET ORAL at 18:49

## 2018-11-27 RX ADMIN — ONDANSETRON 4 MG: 4 TABLET, ORALLY DISINTEGRATING ORAL at 22:14

## 2018-11-27 RX ADMIN — ENOXAPARIN SODIUM 30 MG: 100 INJECTION SUBCUTANEOUS at 22:10

## 2018-11-27 RX ADMIN — DOCUSATE SODIUM 100 MG: 100 CAPSULE, LIQUID FILLED ORAL at 08:59

## 2018-11-27 RX ADMIN — Medication 10 ML: at 13:32

## 2018-11-27 RX ADMIN — LUBIPROSTONE 24 MCG: 24 CAPSULE, GELATIN COATED ORAL at 07:47

## 2018-11-27 RX ADMIN — OXYCODONE AND ACETAMINOPHEN 1 TABLET: 10; 325 TABLET ORAL at 05:03

## 2018-11-27 RX ADMIN — Medication 10 ML: at 22:10

## 2018-11-27 RX ADMIN — ATORVASTATIN CALCIUM 40 MG: 20 TABLET, FILM COATED ORAL at 08:59

## 2018-11-27 RX ADMIN — OXYCODONE AND ACETAMINOPHEN 1 TABLET: 10; 325 TABLET ORAL at 13:31

## 2018-11-27 NOTE — ROUTINE PROCESS
Bedside and Verbal shift change report given to Nancy Wall RN (oncoming nurse) by RICA Gutierrez RN (offgoing nurse). Report included the following information SBAR, Kardex, OR Summary, Intake/Output, MAR and Recent Results.

## 2018-11-27 NOTE — PROGRESS NOTES
Transition of Care (FRANKIE) Plan:     Chart reviewed met with pt at bedside cm role explained, pt lives at home where she is her  caregiver  is w/c bound,pt prearranged with Taz Lucia completed cms notified [facility] health which she was informed that patient's ,CPM,rolling walker and bedside commode has been ordered thru [employer] but was not followed up, equipment will not be delivered to home today,cm informed patient rolling walker was offered to be delivered to room by cm so pt can have it immediately ready at time of discharge for safety pt accepted, dme paperwork for rolling walker explained and signed by pt,cms did notify Shaun ash to cancel r/r order at this time plan will cont with bedside commode and CPM to be delivered to patients home. FRANKIE Transportation: How is patient being transported at discharge? Family/Friend When? Once cleared by Therapy between 12-2pm 
 
 Is transport scheduled? N/A Follow-up appointment and transportation: PCP/Specialist?  See AVS for Appointment Who is transporting to the follow-up appointment? Family/Friend Is transport for follow up appointment scheduled? N/A Communication plan (with patient/family): Who is being called? Patient or Next of Kin? Responsible party? Patient What number(s) is to be used? See [employer] What service provider is calling for [facility] services? When are they calling? 24-48 hours following discharge Readmission Risk? (Green/Low; Yellow/Moderate; Red/High):  Emerson Nolasco

## 2018-11-27 NOTE — DISCHARGE INSTRUCTIONS
Dr. Rj Carroll Post-Operative Instructions Total Knee Replacement    ACTIVITIES :  1. You may be up and walking about the house with your walker. 2.  Activities around the house, such as washing dishes, fixing light meals, and your own personal care are fine. 3.  Avoid strenuous activities, such as vacuuming, lifting laundry or grocery bags. 4.  Walking is the best way to rebuild strength and stamina. Start SLOWLY and gradually increase your distance. 5.  Avoid any jogging, running or excessive stair-climbing   6. Your home physical therapist will work with you and your range-of-motion for the first 7-14 days. After your first visit with Dr. Mckinley Cota you will be scheduled for out-patient physical therapy at a site convenient for you. 7.  Follow-up with Dr. Mckinley Cota in 10-14 days. BATHING and INCISION CARE:  1. The incision may be tender to touch or feel numb: this is normal.   2.  Keep the incision clean and dry no showering until your follow-up appointment. The incision will be closed with sutures under the skin. 3.  Do not apply any lotions, ointments or oils on the incision. 4.  If you notice any excessive swelling, redness, or persistent drainage around the incision, notify the office immediately. DRIVIN. You should not drive until after your follow-up appointment. 2.  You can be in a vehicle for short distances, but if you travel any long distance, please stop about every 30 minutes and walk/stretch. 3.  You should NEVER drive while taking narcotic medication. 4.  Driving will be permitted on right knee replacements after the therapist has confirmed a range-of-motion of a 105 degrees. Left knee replacements may drive at 2 weeks post-op. RETURN TO WORK :  1. The decision to return to work will be determined on an individual basis. 2.  Many people who have a strenuous job (construction, heavy labor, etc) may need to be off work for up to 12 weeks.    3.  If you need a work note, please let us know as soon as possible, and not the same day you are planning to return to work. NUTRITION :  1.  Good nutrition is an essential part of healing. 2.  You should eat a balanced diet each day, including fruits, vegetables, dairy products and protein. 3.  Remember to drink plenty of water. 4.  If you have not had a bowel movement within 3 days of surgery, you will need to use a laxative or suppository that can be obtained over-the-counter at your local pharmacy. MEDICATIONS -  1. You may resume the medications you were taking before surgery. 2.  You will receive a prescription for pain medication at discharge from the hospital. The pain medication works best if taken before the pain becomes severe. 3.  To reduce stomach upset, always take the medication with food. 4.  Begin to wean yourself off the pain medication during the second week after discharge. 5.  If you need a refill, please call the office during working hours at least 2 days before your prescription runs out. Do not wait until your bottle is empty to call for a refill. 6.  You will also be prescribed a blood thinner that you will take by mouth for 10-14 days post-operatively. 7.  DO NOT drive if you are taking narcotic pain medications. HOME HEALTH CARE:  1.   A home health care service has been set-up for you to help assist you once you leave the hospital.  2.  They will contact you either before you leave the hospital or within 24 hours once you have been discharged home. 3. A nurse will assist you with your dressing changes and a Physical Therapist with help you with your therapy needs. 4.  A CPM machine will be delivered to your home. The CPM machine will begin at 0-70 degrees at home and you can add 5 degrees to your range-of-motion each day as tolerated. To a maximum of 120 degrees.      CALL THE OFFICE:   If you have severe pain unrelieved by the medications;   If you have a fever of 101.0°F or greater;    If you notice excessive swelling, redness, or persistent drainage from the incision or IV site; [facility] office number is [phone] from 8:00am to 5:00pm Monday through Friday. After 5:00pm, on weekends, or holidays, please leave a message with our answering service and the doctor on-call will get back to you shortly.

## 2018-11-27 NOTE — PROGRESS NOTES
Problem: Falls - Risk of 
Goal: *Absence of Falls Document Puja Trimble Fall Risk and appropriate interventions in the flowsheet. Outcome: Progressing Towards Goal 
Fall Risk Interventions: 
Mobility Interventions: Patient to call before getting OOB Medication Interventions: Patient to call before getting OOB Elimination Interventions: Call light in reach History of Falls Interventions: Investigate reason for fall

## 2018-11-27 NOTE — PROGRESS NOTES
Problem: Falls - Risk of 
Goal: *Absence of Falls Document Jayson Loja Fall Risk and appropriate interventions in the flowsheet. Outcome: Progressing Towards Goal 
Fall Risk Interventions: 
Mobility Interventions: Patient to call before getting OOB, Utilize walker, cane, or other assistive device Medication Interventions: Patient to call before getting OOB, Teach patient to arise slowly Elimination Interventions: Call light in reach, Patient to call for help with toileting needs History of Falls Interventions: Investigate reason for fall

## 2018-11-27 NOTE — PROGRESS NOTES
Problem: Self Care Deficits Care Plan (Adult) Goal: *Acute Goals and Plan of Care (Insert Text) Occupational Therapy Goals Initiated 11/27/2018 within 7 day(s). 1.  Patient will perform lower body dressing with supervision/set-up 2. Patient will perform toilet transfers with supervision/set-up. 3.  Patient will perform all aspects of toileting with supervision/set-up. 4.  Patient will complete standing with supervision/set-up for 5 minutes during ADL to increase activity tolerance for functional activity. Occupational Therapy EVALUATION Patient: Paris Hoffman (60 y.o. female) Date: 11/27/2018 Primary Diagnosis: RIGHT KNEE OSTEOARTHRITIS, RIGHT  KNEE PAIN, ANXIETY, ELEVATED CHOLESTEROL, DEPRESSION Osteoarthritis of right knee Procedure(s) (LRB): 
RIGHT TOTAL KNEE ARTHROPLASTY (Right) 1 Day Post-Op Precautions: Fall, WBAT 
 
ASSESSMENT : 
Based on the objective data described below, the patient presents with right TKA. Pt tearful with initial visit and checked in with pt again. Pt completed bed mobility with min assist for sit to supine and increased pain with movement. Pt completed LB dressing with CGA. Pt education on home safety, fall prevention and ADL technique and demonstrated understanding through verbal feedback. Pt completed about 5 feet functional mobility using RW and CGA. Pt limited by pain and nausea this session. Pt could benefit from OT to increase I with ADLs, transfers, mobility, activity tolerance and strength for functional activity. Patient will benefit from skilled intervention to address the above impairments. Patients rehabilitation potential is considered to be Good Factors which may influence rehabilitation potential include:  
[]             None noted []             Mental ability/status []             Medical condition []             Home/family situation and support systems []             Safety awareness []             Pain tolerance/management []             Other: PLAN : 
Recommendations and Planned Interventions: 
[x]               Self Care Training                  [x]        Therapeutic Activities [x]               Functional Mobility Training    []        Cognitive Retraining 
[x]               Therapeutic Exercises           [x]        Endurance Activities [x]               Balance Training                   []        Neuromuscular Re-Education []               Visual/Perceptual Training     [x]   Home Safety Training 
[x]               Patient Education                 [x]        Family Training/Education []               Other (comment): Frequency/Duration: Patient will be followed by occupational therapy 1-2 times per day/4-7 days per week to address goals. Discharge Recommendations: Home Health Further Equipment Recommendations for Discharge: N/A  
 
SUBJECTIVE:  
Patient stated I'm doing a little better it really hurts.  OBJECTIVE DATA SUMMARY:  
 
Past Medical History:  
Diagnosis Date  Chronic neck pain  Chronic pain   
 lumbar  Hypercholesterolemia  OA (osteoarthritis)  Psychiatric disorder   
 depression, anxiety  Thromboembolus ([facility] [address] 1990's  
 right leg Past Surgical History:  
Procedure Laterality Date [address]  HX CERVICAL FUSION    
 C6 & C7 anterior  HX GI    
 colonoscopy X2  
 HX HYSTERECTOMY  2000 [place]  
 HX MOHS PROCEDURES  2009  
 right  HX ORTHOPAEDIC  2007 Left 4th finger trigger finger release  HX ROTATOR CUFF REPAIR Bilateral   
 HX SALPINGO-OOPHORECTOMY  [address] Barriers to Learning/Limitations: None Compensate with: visual, verbal, tactile, kinesthetic cues/model Prior Level of Function/Home Situation: I with ADLs prior to admission, caregiver for spouse but reported has assist at home from family and nursing aid 3x a week Home Situation Home Environment: Private residence # Steps to Enter: 0 One/Two Story Residence: Two story # of Interior Steps: 15 Lift Chair Available: Yes Living Alone: No 
Support Systems: Spouse/Significant Other/Partner Patient Expects to be Discharged to[de-identified] Private residence Current DME Used/Available at Home: Commode, bedside, Shower chair Tub or Shower Type: Shower 
[]  Right hand dominant   []  Left hand dominantCognitive/Behavioral Status: 
Neurologic State: Alert; Appropriate for age Orientation Level: Oriented X4 Cognition: Appropriate for age attention/concentration Safety/Judgement: Awareness of environment; Fall prevention Skin: incision on right LE covered with dressing Edema: min edema noted on right LE Vision/Perceptual:   
Corrective Lenses: Glasses Coordination: 
Coordination: Within functional limits Fine Motor Skills-Upper: Left Intact; Right Intact Gross Motor Skills-Upper: Left Intact; Right Intact Balance: 
Sitting: Intact Standing: Intact; With supportStrength: 
Strength: Within functional limits Tone & Sensation: 
Tone: Normal 
Sensation: Intact Range of Motion: 
AROM: Within functional limits Functional Mobility and Transfers for ADLs: 
Bed Mobility: 
Supine to Sit: Supervision Sit to Supine: Minimum assistance Scooting: Supervision Transfers: 
Sit to Stand: Contact guard assistance ADL Assessment: 
Upper Body Dressing: Supervision Lower Body Dressing: Minimum assistance ADL Intervention: 
Cognitive Retraining Safety/Judgement: Awareness of environment; Fall prevention Pain: 
Pain Scale 1: Numeric (0 - 10) Pain Intensity 1: 7 Pain Location 1: Knee Pain Orientation 1: Right Pain Description 1: Aching Pain Intervention(s) 1: Medication (see MAR) Activity Tolerance:  
fair Please refer to the flowsheet for vital signs taken during this treatment. After treatment:  
[] Patient left in no apparent distress sitting up in chair 
[x] Patient left in no apparent distress in bed 
[x] Call bell left within reach [x] Nursing notified [] Caregiver present 
[] Bed alarm activated COMMUNICATION/EDUCATION:  
[x] Home safety education was provided and the patient/caregiver indicated understanding. [x] Patient/family have participated as able in goal setting and plan of care. [x] Patient/family agree to work toward stated goals and plan of care. [] Patient understands intent and goals of therapy, but is neutral about his/her participation. [] Patient is unable to participate in goal setting and plan of care. Thank you for this referral. 
Karena Hickey, OTR/L Time Calculation: 15 mins Carry  Current  CJ= 20-39%  Goal  CI= 1-19%. The severity rating is based on the Level of Assistance required for Functional Mobility and ADLs.

## 2018-11-27 NOTE — PROGRESS NOTES
2006 -  Head to toe assessment performed at this time. Pt denies any chest pain or SOB. Pt denies any numbness or tingling to extremities. Pt encouraged to manage pain and to use the incentive spirometer. Pt educated on the side effects of medications taken. Pt left with call light within reach and bed in low position. Will continue to monitor. Shift summary - Pt pain managed with prn medication per MAR. Pt informed about all medications and side effects and encouraged to ask questions about medication. Pt encouraged throughout the night to use incentive spirometer and the purpose of the incentive spirometer. Pt left with no signs of distress and any concerns of pt addressed.

## 2018-11-27 NOTE — PROGRESS NOTES
Problem: Mobility Impaired (Adult and Pediatric) Goal: *Acute Goals and Plan of Care (Insert Text) In 1-7 days pt will be able to perform: ST.  Bed mobility:  Rolling L to R to L modified independent for positioning. 2.  Supine to sit to supine S with HR for meals. 3.  Sit to stand to sit S with RW in prep for ambulation. LT.  Gait:  Ambulate >150ft S with RW, WBAT, for home/community mobility. 2.  Activity tolerance: Tolerate up in chair 1-2 hours for ADLs. 3.  Patient/Family Education:  Patient/family to be independent with HEP for follow-up care and safe discharge. Outcome: Progressing Towards Goal 
physical Therapy TREATMENT Patient: Francisco Liu (15 y.o. female) Date: 2018 Diagnosis: RIGHT KNEE OSTEOARTHRITIS, RIGHT  KNEE PAIN, ANXIETY, ELEVATED CHOLESTEROL, DEPRESSION Osteoarthritis of right knee Primary osteoarthritis of right knee Procedure(s) (LRB): 
RIGHT TOTAL KNEE ARTHROPLASTY (Right) 1 Day Post-Op Precautions: Fall, WBAT Chart, physical therapy assessment, plan of care and goals were reviewed. PLOF: caregiver for spouse, ambulatory without AD, needs a RW, has a lift chair for stairs ASSESSMENT: 
Pt tearful due to pain but agreeable to participate in PT.  VC for self assist techniques to mobilize RLE in/out of bed. Stand pivot TF with RW from bed to [facility] to void. Voided 400cc, independent with josie-care in sitting. Ambulated with RW, 30ft 2x with seated rest break in between. Step to gt pattern, decreased pace, no LOB or knee buckling noted. Returned to supine in bed, removed KI, donned ice packs. Education: self assist tech for RLE Progression toward goals: 
[]      Improving appropriately and progressing toward goals [x]      Improving slowly and progressing toward goals due to pain 
[]      Not making progress toward goals and plan of care will be adjusted PLAN: 
Patient continues to benefit from skilled intervention to address the above impairments. Continue treatment per established plan of care. Discharge Recommendations:  Home Health Further Equipment Recommendations for Discharge:  rolling walker SUBJECTIVE:  
Patient stated I will try, I want to go home.  OBJECTIVE DATA SUMMARY:  
Critical Behavior: 
Neurologic State: Alert, Appropriate for age Orientation Level: Appropriate for age, Oriented X4 Cognition: Appropriate decision making, Appropriate for age attention/concentration, Appropriate safety awareness Safety/Judgement: Awareness of environment Functional Mobility Training: 
Bed Mobility: 
Supine to Sit: Stand-by assistance; Additional time Sit to Supine: Stand-by assistance; Additional time Scooting: Supervision Transfers: 
Sit to Stand: Contact guard assistance Stand to Sit: Contact guard assistance Balance: 
Sitting: Intact Standing: Intact; With supportAmbulation/Gait Training: 
Distance (ft): 60 Feet (ft) Assistive Device: Brace/Splint;Gait belt;Walker, rolling Ambulation - Level of Assistance: Contact guard assistance Gait Abnormalities: Antalgic;Decreased step clearance; Step to gait Right Side Weight Bearing: As tolerated Speed/Heaven: Slow [address] [phone] Current  CJ= 20-39%   Goal  CI= 1-19%. The severity rating is based on the Level of Assistance required for Functional Mobility and ADLs. Pain: 
Pre:7 Post:7 Pain Scale 1: Numeric (0 - 10) Pain Intensity 1: 7 Pain Location 1: Knee Pain Orientation 1: Right Pain Description 1: Aching Pain Intervention(s) 1: ice Activity Tolerance:  
Fair Please refer to the flowsheet for vital signs taken during this treatment. After treatment:  
[] Patient left in no apparent distress sitting up in chair 
[x] Patient left in no apparent distress in bed 
[x] Call bell left within reach 
[] Nursing notified 
[] Caregiver present 
[] Bed alarm activated Pebbles Duke PTA Time Calculation: 29 mins

## 2018-11-27 NOTE — PROGRESS NOTES
Assist pt up to [facility] via walker. Pain stated 10/10. Oxycodone 10 mg given PO. BTB reposition for comfort . Apply ice to right knee. Call bel within reach.

## 2018-11-27 NOTE — PROGRESS NOTES
1652: Pt just up to the bathroom and experiencing a lot of pain but is now getting comfortable. Refusing 3rd PT session. Will follow up tomorrow.

## 2018-11-27 NOTE — PROGRESS NOTES
Progress Note POD #1 Patient: Devorah Flores               Sex: female          DOA: 11/26/2018 YOB: 1950      Surgery: Procedure(s): RIGHT TOTAL KNEE ARTHROPLASTY         
 LOS: 1 day Subjective: No new complaints Objective:  
  
Visit Vitals /76 Pulse 71 Temp 98 °F (36.7 °C) Resp 13 Ht 5' 3.5\" (1.613 m) Wt 73.6 kg (162 lb 5 oz) SpO2 96% BMI 28.30 kg/m² Physical Exam: 
Neurological: Dressing C/D/I.   
                        sensation: intact to light touch. No calf pain to palpation. Patient mobility Bed Mobility Training Supine to Sit: Minimum assistance, Additional time(vc) 
Sit to Supine: Minimum assistance, Additional time(vc) 
Scooting: Minimum assistance, Additional time(vc) 
Transfer Training Sit to Stand: Minimum assistance, Additional time(vc) 
Stand to Sit: Minimum assistance, Additional time(vc) 
   
Gait Training Assistive Device: Walker, rolling, Gait belt, Brace/Splint Ambulation - Level of Assistance: Minimal assistance(vc) 
Distance (ft): 4 Feet (ft) Interventions: Safety awareness training, Tactile cues, Verbal cues, Visual/Demos Weight Bearing Status Right Side Weight Bearing: As tolerated Intake and Output: 
Current Shift:  No intake/output data recorded. Last three shifts:  11/25 1901 - 11/27 0700 In: 3084 [P.O.:1050; I.V.:2034] Out: 2650 [Urine:2400; Drains:240] Lab/Data Reviewed: 
Lab Results Component Value Date/Time WBC 5.6 11/13/2018 02:10 PM  
 HGB 11.0 (L) 11/27/2018 03:10 AM  
 HCT 32.6 (L) 11/27/2018 03:10 AM  
 PLATELET 556 50/07/7816 02:10 PM  
 MCV 97.9 (H) 11/13/2018 02:10 PM  
 
Lab Results Component Value Date/Time  
 aPTT 26.9 09/05/2016 10:27 AM  
 
Lab Results Component Value Date/Time INR 0.9 09/05/2016 10:27 AM  
 Prothrombin time 12.1 09/05/2016 10:27 AM  
  
 
 
Assessment/Plan Active Problems: * No active hospital problems. * 
 
 
1. Stable 2. OOB with PT 
3. D/C Planning 4. D/C drain & change dressing prior to discharge home. 5.Discharge to home after being cleared by P.T 6. Follow-up in 10 days with Dr. Bailey Stone.

## 2018-11-27 NOTE — ROUTINE PROCESS
1635 
Bedside and Verbal shift change report given to Denver Broody, LPN (oncoming nurse) by Evangelina Tucker RN (offgoing nurse). Report included the following information SBAR, Kardex, MAR and Recent Results.

## 2018-11-27 NOTE — PROGRESS NOTES
1630- Received pt alert and oriented x 4 Lungs clear. BS x 4. Palpable pedal pulses bilaterally. No homans sign. No JVD noted. Equal . Pain stated 7/10 in knees. Right knee with ace wrap C/D/I. Fresh ice applied. Reposition for comfort. Pt resting watching TV. Dinner ordered. Call bel at side.

## 2018-11-27 NOTE — PROGRESS NOTES
0756 
Pt is awake, alert, and oriented x 4. Lying in bed. Pain level 7/10. Ace wrap dressing to right leg dry and intact. Hemovac drain  With serosanguinous drainage. Plan for pt is discharge today after PT clearance. 3235 Pt has voided via [facility]. 
9164 Pt ib bed. Pain level 8/10. Medicated with Percocet 10/325 mg po and Toradol 15 mg IV. Lungs are clear. Abdomen soft with active BS. Pedal pulses present. Encouraged to use IS.  
1000 Pain level 7/10  After pain med. [address] Pt was seen by PT. Pt ambulated in room 2 doorway 2x then back in bed. Knee immobilizer was removed by Pt and Ice packs were applied. 11:38 AM 
 Resting in bed. Pain level is 7/10 but much better as per pt.   
1:35 PM 
 Pt has voided  In BR. Medicated with Percocet 10/325 mg po for pain level 7/10.   
2:37 PM 
 Pt was seen by PT. Pt ambulated 90 ftt but did not clear PT. Pt still in severe pain when ambulating. [address] Pt voided in BR. Pt still with severe burning pain when ambulating.

## 2018-11-27 NOTE — PROGRESS NOTES
Problem: Mobility Impaired (Adult and Pediatric) Goal: *Acute Goals and Plan of Care (Insert Text) In 1-7 days pt will be able to perform: ST.  Bed mobility:  Rolling L to R to L modified independent for positioning. 2.  Supine to sit to supine S with HR for meals. 3.  Sit to stand to sit S with RW in prep for ambulation. LT.  Gait:  Ambulate >150ft S with RW, WBAT, for home/community mobility. 2.  Activity tolerance: Tolerate up in chair 1-2 hours for ADLs. 3.  Patient/Family Education:  Patient/family to be independent with HEP for follow-up care and safe discharge. Outcome: Progressing Towards Goal 
physical Therapy TREATMENT Patient: Kit Mejia (89 y.o. female) Date: 2018 Diagnosis: RIGHT KNEE OSTEOARTHRITIS, RIGHT  KNEE PAIN, ANXIETY, ELEVATED CHOLESTEROL, DEPRESSION Osteoarthritis of right knee Primary osteoarthritis of right knee Procedure(s) (LRB): 
RIGHT TOTAL KNEE ARTHROPLASTY (Right) 1 Day Post-Op Precautions: Fall, WBAT Chart, physical therapy assessment, plan of care and goals were reviewed. 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 
Pt again tearful throughout session, limited progress towards d/c due to pain. VC and MC for self assist hook tech to assist RLE in/out of bed. Increased time needed to carry out bed mobility. Pt ambulated 90ft with RW, many standing rest breaks, step to gt pattern, very slow pace, no LOB or knee buckling noted. Pt returned to bed, provided with fresh ice packs. Notified nurse. Education: self assist hook tech with LEs Progression toward goals: 
[]      Improving appropriately and progressing toward goals [x]      Improving slowly and progressing toward goals 
[]      Not making progress toward goals and plan of care will be adjusted PLAN: 
Patient continues to benefit from skilled intervention to address the above impairments. Continue treatment per established plan of care. Discharge Recommendations:  Home Health Further Equipment Recommendations for Discharge:  RW delivered SUBJECTIVE:  
Patient stated Jae Siemens got up to the bathroom not long ago.  OBJECTIVE DATA SUMMARY:  
Critical Behavior: 
Neurologic State: Alert, Appropriate for age Orientation Level: Oriented X4 Cognition: Appropriate for age attention/concentration Safety/Judgement: Awareness of environment, Fall prevention Functional Mobility Training: 
Bed Mobility: 
Supine to Sit: Additional time;Contact guard assistance;Stand-by assistance Sit to Supine: Contact guard assistance Scooting: Contact guard assistance Transfers: 
Sit to Stand: Contact guard assistance Stand to Sit: Stand-by assistance Balance: 
Sitting: Intact; With support Standing: Intact; With supportAmbulation/Gait Training: 
Distance (ft): 90 Feet (ft) Assistive Device: Gait belt;Walker, rolling Ambulation - Level of Assistance: Contact guard assistance Gait Abnormalities: Antalgic;Decreased step clearance; Step to gait Right Side Weight Bearing: As tolerated Speed/Heaven: Delayed; Slow Step Length: Left shortened Swing Pattern: Right asymmetrical 
GCODE:Mobility [id number] Current  CJ= 20-39%   Goal  CI= 1-19%. The severity rating is based on the Level of Assistance required for Functional Mobility and ADLs. Pain: 
Pre:8 Post:10 Pain Scale 1: Numeric (0 - 10) Pain Location 1: Knee Pain Orientation 1: Right Pain Description 1: Aching Activity Tolerance:  
Fair(-) Please refer to the flowsheet for vital signs taken during this treatment. After treatment:  
[] Patient left in no apparent distress sitting up in chair 
[x] Patient left in no apparent distress in bed 
[x] Call bell left within reach [x] Nursing notified 
[] Caregiver present 
[] Bed alarm activated Flory Monae PTA Time Calculation: 31 mins

## 2018-11-28 VITALS
WEIGHT: 162.31 LBS | BODY MASS INDEX: 27.71 KG/M2 | SYSTOLIC BLOOD PRESSURE: 98 MMHG | DIASTOLIC BLOOD PRESSURE: 59 MMHG | OXYGEN SATURATION: 94 % | HEART RATE: 74 BPM | HEIGHT: 64 IN | RESPIRATION RATE: 16 BRPM | TEMPERATURE: 97.9 F

## 2018-11-28 LAB
HCT VFR BLD AUTO: 32 % (ref 35–45)
HGB BLD-MCNC: 10.6 G/DL (ref 12–16)

## 2018-11-28 PROCEDURE — 90686 IIV4 VACC NO PRSV 0.5 ML IM: CPT | Performed by: ORTHOPAEDIC SURGERY

## 2018-11-28 PROCEDURE — 74011250636 HC RX REV CODE- 250/636: Performed by: ORTHOPAEDIC SURGERY

## 2018-11-28 PROCEDURE — 90471 IMMUNIZATION ADMIN: CPT

## 2018-11-28 PROCEDURE — 74011250637 HC RX REV CODE- 250/637: Performed by: PHYSICIAN ASSISTANT

## 2018-11-28 PROCEDURE — 77030037875 HC DRSG MEPILEX <16IN BORD MOLN -A

## 2018-11-28 PROCEDURE — 97116 GAIT TRAINING THERAPY: CPT

## 2018-11-28 PROCEDURE — 97110 THERAPEUTIC EXERCISES: CPT

## 2018-11-28 PROCEDURE — 74011250637 HC RX REV CODE- 250/637: Performed by: ORTHOPAEDIC SURGERY

## 2018-11-28 PROCEDURE — 85018 HEMOGLOBIN: CPT

## 2018-11-28 PROCEDURE — 74011250636 HC RX REV CODE- 250/636: Performed by: PHYSICIAN ASSISTANT

## 2018-11-28 PROCEDURE — 36415 COLL VENOUS BLD VENIPUNCTURE: CPT

## 2018-11-28 PROCEDURE — 77030011256 HC DRSG MEPILEX <16IN NO BORD MOLN -A

## 2018-11-28 RX ORDER — ONDANSETRON 4 MG/1
8 TABLET, ORALLY DISINTEGRATING ORAL
Status: DISCONTINUED | OUTPATIENT
Start: 2018-11-28 | End: 2018-11-28 | Stop reason: HOSPADM

## 2018-11-28 RX ORDER — ONDANSETRON 8 MG/1
8 TABLET, ORALLY DISINTEGRATING ORAL
Qty: 30 TAB | Refills: 0 | Status: SHIPPED | OUTPATIENT
Start: 2018-11-28

## 2018-11-28 RX ORDER — HYDROMORPHONE HYDROCHLORIDE 4 MG/1
4-6 TABLET ORAL
Status: DISCONTINUED | OUTPATIENT
Start: 2018-11-28 | End: 2018-11-28 | Stop reason: HOSPADM

## 2018-11-28 RX ORDER — DIAZEPAM 5 MG/1
2.5-5 TABLET ORAL
Qty: 60 TAB | Refills: 0 | Status: SHIPPED | OUTPATIENT
Start: 2018-11-28 | End: 2018-11-28

## 2018-11-28 RX ORDER — DIAZEPAM 5 MG/1
2.5-5 TABLET ORAL
Status: DISCONTINUED | OUTPATIENT
Start: 2018-11-28 | End: 2018-11-28

## 2018-11-28 RX ORDER — HYDROMORPHONE HYDROCHLORIDE 4 MG/1
4-6 TABLET ORAL
Qty: 84 TAB | Refills: 0 | Status: SHIPPED | OUTPATIENT
Start: 2018-11-28

## 2018-11-28 RX ADMIN — HYDROMORPHONE HYDROCHLORIDE 4 MG: 4 TABLET ORAL at 14:02

## 2018-11-28 RX ADMIN — LUBIPROSTONE 24 MCG: 24 CAPSULE, GELATIN COATED ORAL at 09:46

## 2018-11-28 RX ADMIN — Medication 10 ML: at 07:51

## 2018-11-28 RX ADMIN — HYDROMORPHONE HYDROCHLORIDE 4 MG: 2 TABLET ORAL at 07:40

## 2018-11-28 RX ADMIN — KETOROLAC TROMETHAMINE 15 MG: 15 INJECTION, SOLUTION INTRAMUSCULAR; INTRAVENOUS at 07:40

## 2018-11-28 RX ADMIN — HYDROMORPHONE HYDROCHLORIDE 4 MG: 2 TABLET ORAL at 02:06

## 2018-11-28 RX ADMIN — INFLUENZA VIRUS VACCINE 0.5 ML: 15; 15; 15; 15 SUSPENSION INTRAMUSCULAR at 16:15

## 2018-11-28 RX ADMIN — ATORVASTATIN CALCIUM 40 MG: 20 TABLET, FILM COATED ORAL at 09:46

## 2018-11-28 RX ADMIN — ENOXAPARIN SODIUM 30 MG: 100 INJECTION SUBCUTANEOUS at 09:44

## 2018-11-28 RX ADMIN — DOCUSATE SODIUM 100 MG: 100 CAPSULE, LIQUID FILLED ORAL at 09:46

## 2018-11-28 RX ADMIN — SERTRALINE HYDROCHLORIDE 150 MG: 50 TABLET ORAL at 09:46

## 2018-11-28 RX ADMIN — FERROUS SULFATE TAB 325 MG (65 MG ELEMENTAL FE) 325 MG: 325 (65 FE) TAB at 09:46

## 2018-11-28 NOTE — DISCHARGE SUMMARY
Discharge Summary    Patient: Enedina Dixon               Sex: female          DOA: 11/26/2018         YOB: 1950      Age:  76 y.o.        LOS:  LOS: 2 days                Admit Date: 11/26/2018    Discharge Date: 11/28/2018    Admission Diagnoses: RIGHT KNEE OSTEOARTHRITIS, RIGHT  KNEE PAIN, ANXIETY, ELEVATED CHOLESTEROL, DEPRESSION  Osteoarthritis of right knee    Discharge Diagnoses:    Problem List as of 11/28/2018 Date Reviewed: 11/26/2018          Codes Class Noted - Resolved    Rotator cuff impingement syndrome (Chronic) ICD-10-CM: M75.40  ICD-9-CM: 726.10  9/17/2012 - Present        RESOLVED: Osteoarthritis of right knee ICD-10-CM: M17.11  ICD-9-CM: 715.96  11/26/2018 - 11/27/2018        * (Principal) RESOLVED: Primary osteoarthritis of right knee ICD-10-CM: M17.11  ICD-9-CM: 715.16  11/14/2018 - 11/27/2018              Discharge Condition: Good    Hospital Course: The patient underwent Right TKA on 11/26/2018. The patient tolerated the procedure well. Vital signs remained stable and the patient was transferred to  2nd floor surgical unit without complications. The patient remained neurovascularly intact and began getting out of bed with physical therapy on  POD #1. Pain was controlled with Dilaudid PCA and Dilaudid for break through pain. Pain issues on POD #1 were addressed. The PCA pump and woodson catheter were discontinued on POD#1. The drain was discontinued on POD#2. The patient progressed with physical therapy and was ambulating 90 feet on POD #1 and was therefore discharged the evening of POD#2. The patient had begun Lovenox for DVT prophylaxis on POD#1.     Consults: None    Significant Diagnostic Studies:   Recent Labs     11/28/18  0300 11/27/18  0310   HGB 10.6* 11.0*     Recent Labs     11/28/18  0300 11/27/18  0310   HCT 32.0* 32.6*       Current Discharge Medication List      START taking these medications    Details   HYDROmorphone (DILAUDID) 4 mg tablet Take 1-1.5 Tabs by mouth every four (4) hours as needed. Max Daily Amount: 36 mg.  Qty: 84 Tab, Refills: 0    Associated Diagnoses: Primary osteoarthritis of right knee      ondansetron (ZOFRAN ODT) 8 mg disintegrating tablet Take 1 Tab by mouth every eight (8) hours as needed. Qty: 30 Tab, Refills: 0      aspirin (ASPIRIN) 325 mg tablet Take 2 pills 2 x day for 6 weeks  Qty: 120 Tab, Refills: 1    Associated Diagnoses: Primary osteoarthritis of right knee      naloxone (NARCAN) 4 mg/actuation nasal spray Use 1 spray intranasally, then discard. Repeat with new spray every 2 min as needed for opioid overdose symptoms, alternating nostrils. Qty: 1 Each, Refills: 0    Associated Diagnoses: Primary osteoarthritis of right knee         CONTINUE these medications which have NOT CHANGED    Details   sertraline (ZOLOFT) 100 mg tablet Take 150 mg by mouth daily. atorvastatin (LIPITOR) 40 mg tablet Take 40 mg by mouth daily. LORazepam (ATIVAN) 0.5 mg tablet Take 1 mg by mouth as needed. STOP taking these medications       acetaminophen (TYLENOL EXTRA STRENGTH) 500 mg tablet Comments:   Reason for Stopping:         naproxen sodium (ALEVE) 220 mg tablet Comments:   Reason for Stopping:               Activity: activity as tolerated, weight bearing as tolerated     No anti- inflammatory medications for 3 months. Use stool softeners at home while taking pain medications since  they are constipating. Diet: Regular Diet    Wound Care: Keep wound clean and dry, Reinforce dressing PRN and ice to area for comfort. Do not get wound wet for 5 days.     Follow-up: 10 days with Dr Tunde Knott

## 2018-11-28 NOTE — PROGRESS NOTES
Progress Note POD #2 Patient: Justa Frazier               Sex: female          DOA: 11/26/2018 YOB: 1950      Surgery: Procedure(s): RIGHT TOTAL KNEE ARTHROPLASTY         
 LOS: 2 days Subjective:  
 
 C/O knee pain. Some nausea. Objective:  
  
Visit Vitals /85 Pulse 80 Temp 99.1 °F (37.3 °C) Resp 16 Ht 5' 3.5\" (1.613 m) Wt 73.6 kg (162 lb 5 oz) SpO2 94% BMI 28.30 kg/m² Physical Exam: 
Neurological: Dressing C/D/I.   
                        sensation: intact to light touch. No calf pain to palpation. Patient mobility Bed Mobility Training Supine to Sit: Additional time, Contact guard assistance, Stand-by assistance Sit to Supine: Contact guard assistance Scooting: Contact guard assistance Transfer Training Sit to Stand: Contact guard assistance Stand to Sit: Stand-by assistance Gait Training Assistive Device: Gait belt, Walker, rolling Ambulation - Level of Assistance: Contact guard assistance Distance (ft): 90 Feet (ft) Interventions: Safety awareness training, Tactile cues, Verbal cues, Visual/Demos Weight Bearing Status Right Side Weight Bearing: As tolerated Patient Response Patient Response: poor due to pain Intake and Output: 
Current Shift:  11/28 0701 - 11/28 1900 In: -  
Out: 800 [Urine:800] Last three shifts:  11/26 1901 - 11/28 0700 In: 2794 [P.O.:950; I.V.:1844] Out: 7385 [Urine:5200; Drains:230] Lab/Data Reviewed: 
Lab Results Component Value Date/Time WBC 5.6 11/13/2018 02:10 PM  
 HGB 10.6 (L) 11/28/2018 03:00 AM  
 HCT 32.0 (L) 11/28/2018 03:00 AM  
 PLATELET 032 38/72/5761 02:10 PM  
 MCV 97.9 (H) 11/13/2018 02:10 PM  
 
Lab Results Component Value Date/Time  
 aPTT 26.9 09/05/2016 10:27 AM  
 
Lab Results Component Value Date/Time INR 0.9 09/05/2016 10:27 AM  
 Prothrombin time 12.1 09/05/2016 10:27 AM  
  
 
 
Assessment/Plan Active Problems: * No active hospital problems. * 
 
 
1. Stable 2. OOB with PT 
3. D/C Planning 4. Increase Dilaudid dosage. 5. D/C drain & change dressing prior to discharge home. 6.Discharge to home after being cleared by P.T 7. Follow-up in 10 days with Dr. Suleiman Hansen. 8. Zofran for nausea prn.

## 2018-11-28 NOTE — PROGRESS NOTES
0609 Received report from Mayda Mcgarry RN at patients bedside. Patient laying in bed. Pain 4/10. Call bell within reach, gripper socks on, TEDs on, and pt in no apparent distress. 8549 Shift assessment completed. Pt A&O x 4. Lung sounds clear bilaterally. Apical pulse regular. Strength in upper extremities strong and equal bilaterally. Capillary refill less than 3 seconds bilaterally. Radial pulses palpable and equal bilaterally. 18 G IV site to right hand clean, dry and intact. Skin warm to the touch and dry. Elastic dressing to right knee is CDI . Pedal pulses palpable bilaterally. Strength in right lower extremity was limited and week, and the left lower extremity was appropriate. PT denies SOB & numbness/tingling. Left LEXA stockings on. Plexis on bilateral. Patient states pain was 4/10. Call bell within reach and gripper socks on. Fall band on. Pt hemovac charged patent and draining. 1402 Patient stated pain was 4/10. PRN Dilaudid 4 mg given. Patient educated on side effects and will monitor for relief. 1445 Changed pt dressing and hemovac. Hemovac bled a moderate amount on removal. Changed dressing to mepilex. Dressing CDI 
 
1645 Discharge instructions reviewed with patient at this time. Opportunity for questions and clarification was provided. Patient has verbalized understanding. Patient was provided with care notes to include side effects of RX's. Arm bands removed and shredded. AVS reviewed with Emma Aceves RN. IV removed. Dressing CDI. FLU screening has been completed and verified. Vaccine during this admission: yes

## 2018-11-28 NOTE — PROGRESS NOTES
Problem: Mobility Impaired (Adult and Pediatric) Goal: *Acute Goals and Plan of Care (Insert Text) In 1-7 days pt will be able to perform: ST.  Bed mobility:  Rolling L to R to L modified independent for positioning. 2.  Supine to sit to supine S with HR for meals. 3.  Sit to stand to sit S with RW in prep for ambulation. LT.  Gait:  Ambulate >150ft S with RW, WBAT, for home/community mobility. 2.  Activity tolerance: Tolerate up in chair 1-2 hours for ADLs. 3.  Patient/Family Education:  Patient/family to be independent with HEP for follow-up care and safe discharge. Outcome: Progressing Towards Goal 
physical Therapy TREATMENT Patient: Delfino Le (15 y.o. female) Date: 2018 Diagnosis: RIGHT KNEE OSTEOARTHRITIS, RIGHT  KNEE PAIN, ANXIETY, ELEVATED CHOLESTEROL, DEPRESSION Osteoarthritis of right knee Primary osteoarthritis of right knee Procedure(s) (LRB): 
RIGHT TOTAL KNEE ARTHROPLASTY (Right) 2 Days Post-Op Precautions: Fall, WBAT Chart, physical therapy assessment, plan of care and goals were reviewed. 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 
Progress limited this session due to dizziness and nausea, no change throughout session. BP at rest 123/70, s/p ambulation 112/69. Voided 600cc, independent with josie-care. Ambulated 65ft total with RW, step to gt pattern, very slow pace, multiple standing rest breaks due to dizziness. Returned to supine in bed and performed supine ROM strengthening exercises. Education: HEP 3x/day, RW mgmt and safety Progression toward goals: 
[]      Improving appropriately and progressing toward goals [x]      Improving slowly and progressing toward goals 
[]      Not making progress toward goals and plan of care will be adjusted PLAN: 
Patient continues to benefit from skilled intervention to address the above impairments. Continue treatment per established plan of care. Discharge Recommendations:  [address] [facility] Further Equipment Recommendations for Discharge:  RW  delivered SUBJECTIVE:  
Patient stated I am moving better.  OBJECTIVE DATA SUMMARY:  
Critical Behavior: 
Neurologic State: Alert, Appropriate for age Orientation Level: Oriented X4 Cognition: Appropriate for age attention/concentration Safety/Judgement: Awareness of environment, Fall prevention Functional Mobility Training: 
Bed Mobility: 
Supine to Sit: Modified independent; Additional time Sit to Supine: Stand-by assistance Transfers: 
Sit to Stand: Contact guard assistance;Stand-by assistance Stand to Sit: Stand-by assistance Balance: 
Sitting: Intact Standing: Intact; With supportAmbulation/Gait Training: 
Distance (ft): 65 Feet (ft) Assistive Device: Gait belt;Walker, rolling Ambulation - Level of Assistance: Contact guard assistance Gait Abnormalities: Antalgic;Decreased step clearance Right Side Weight Bearing: As tolerated Speed/Heaven: Slow [address] [facility] [id number] Current  CI= 1-19%   Goal  CI= 1-19%. The severity rating is based on the Level of Assistance required for Functional Mobility and ADLs. Therapeutic Exercises:  
Supine: QS. HS, ham set, SLR, SAQ Pain: 
Pre:3 Post:4 Pain Scale 1: Numeric (0 - 10) Pain Location 1: Knee Pain Orientation 1: Right Pain Description 1: Aching Activity Tolerance:  
Fair(-) Please refer to the flowsheet for vital signs taken during this treatment. After treatment:  
[] Patient left in no apparent distress sitting up in chair 
[x] Patient left in no apparent distress in bed 
[x] Call bell left within reach [x] Nursing notified 
[] Caregiver present 
[] Bed alarm activated Leanne Martell PTA Time Calculation: 33 mins

## 2018-11-28 NOTE — PROGRESS NOTES
2141 - Dr. Andrew Monroy paged regarding patient's uncontrolled pain. 2144 - Received call back from Dr. Andrew Monroy. Explained to him that patient was in tears, rating pain a 10/10 despite taking percocet 10. Received order to switch from percocet to dilaudid 2-4 mg every 4 hours PRN and to restart toradol 15 mg IV every 6 hours. 2210 - Pain 6/10. PRN dilaudid pain medication administered at this time. Patient has been educated on side effects. Side effect education sheets have been provided. 0206 - Pain 4/10. PRN dilaudid pain medication administered at this time. Patient has been educated on side effects. Side effect education sheets have been provided. 0740 - Pain 4/10. PRN dilaudid pain medication administered at this time. Patient has been educated on side effects. Side effect education sheets have been provided. 0745 - Bedside and Verbal shift change report given to Harry Colunga RN by Asaf Olsen RN. Report included the following information SBAR, Kardex, OR Summary, Intake/Output and MAR.

## 2018-11-28 NOTE — PROGRESS NOTES
Problem: Mobility Impaired (Adult and Pediatric) Goal: *Acute Goals and Plan of Care (Insert Text) In 1-7 days pt will be able to perform: ST.  Bed mobility:  Rolling L to R to L modified independent for positioning. 2.  Supine to sit to supine S with HR for meals. 3.  Sit to stand to sit S with RW in prep for ambulation. LT.  Gait:  Ambulate >150ft S with RW, WBAT, for home/community mobility. 2.  Activity tolerance: Tolerate up in chair 1-2 hours for ADLs. 3.  Patient/Family Education:  Patient/family to be independent with HEP for follow-up care and safe discharge. Outcome: Resolved/Met Date Met: 18 physical Therapy TREATMENT/DISCHARGE Patient: Williams Mishra (89 y.o. female) Date: 2018 Diagnosis: RIGHT KNEE OSTEOARTHRITIS, RIGHT  KNEE PAIN, ANXIETY, ELEVATED CHOLESTEROL, DEPRESSION Osteoarthritis of right knee Primary osteoarthritis of right knee Procedure(s) (LRB): 
RIGHT TOTAL KNEE ARTHROPLASTY (Right) 2 Days Post-Op Precautions: Fall, WBAT Chart, physical therapy assessment, plan of care and goals were reviewed. ASSESSMENT: 
Pt utilizes self assist hook tech to  Mobilize RLE in/out of bed. Sit to stand performed with bed in lowest position without difficulty. Ambulated 160ft with RW, step to gt pattern, decreases pace, no LOB or path deviations, multiple standing breaks. Pt returned to room and left supine in bed. EDUCATION HEP 3x/day, ambulate hourly, ice Progression toward goals: 
[x]      Goals met 
[]      Improving appropriately and progressing toward goals 
[]      Improving slowly and progressing toward goals 
[]      Not making progress toward goals and plan of care will be adjusted PLAN: 
Patient will be discharged from physical therapy at this time. Rationale for discharge: 
[x] Goals Achieved 
[] [address] 
[] Patient not participating in therapy 
[] Other: 
Discharge Recommendations:  [address] Further Equipment Recommendations for Discharge:  Has a RW SUBJECTIVE:  
Patient stated Better.  OBJECTIVE DATA SUMMARY:  
 
G CODES: Mobility  [id number] Goal  CI= 1-19%  D/C  CI= 1-19%. The severity rating is based on the Level of Assistance required for Functional Mobility and ADLs. Critical Behavior: 
Neurologic State: Alert, Appropriate for age Orientation Level: Oriented X4 Cognition: Appropriate for age attention/concentration Safety/Judgement: Awareness of environment, Fall prevention Functional Mobility Training: 
Bed Mobility: 
Supine to Sit: Modified independent Sit to Supine: Modified independent Transfers: 
Sit to Stand: Supervision Stand to Sit: Supervision Balance: 
Sitting: Intact Standing: Intact; With supportAmbulation/Gait Training: 
Distance (ft): 160 Feet (ft) Assistive Device: Gait belt;Walker, rolling Ambulation - Level of Assistance: Stand-by assistance;Supervision Gait Abnormalities: Antalgic;Decreased step clearance Right Side Weight Bearing: As tolerated Speed/Heaven: Slow Pain: 
Pre treatment pain:  2 Post treatment pain:  4 Pain Scale 1: Numeric (0 - 10) Pain Location 1: Knee Pain Orientation 1: Right Pain Description 1: Aching Activity Tolerance:  
Fair Please refer to the flowsheet for vital signs taken during this treatment. After treatment:  
[] Patient left in no apparent distress sitting up in chair 
[x] Patient left in no apparent distress in bed 
[x] Call bell left within reach [x] Nursing notified 
[] Caregiver present 
[] Bed alarm activated Millie Lemos PTA Time Calculation: 23 mins

## 2018-11-28 NOTE — PROGRESS NOTES
Bedside, Verbal and Written shift change report given to TERESA Sandoval (oncoming nurse) by Abelino Oconnell LPN (offgoing nurse). Report included the following information SBAR, Kardex, Intake/Output, MAR, Recent Results and Med Rec Status.

## (undated) DEVICE — PREP SKN PREVAIL 40ML APPL --

## (undated) DEVICE — SOL IRRIGATION INJ NACL 0.9% 500ML BTL

## (undated) DEVICE — STERILE TETRA-FLEX CF LF, 6IN X 11 YD: Brand: TETRA-FLEX™ CF

## (undated) DEVICE — REM POLYHESIVE ADULT PATIENT RETURN ELECTRODE: Brand: VALLEYLAB

## (undated) DEVICE — HANDPIECE SET WITH FAN SPRAY TIP: Brand: INTERPULSE

## (undated) DEVICE — SOL INJ L R 1000ML BG --

## (undated) DEVICE — BLADE SAW 1.27X90 MM FOR LG BNE [EZ2513PM62STE] [KOMET MEDICAL]

## (undated) DEVICE — STERILE POLYISOPRENE POWDER-FREE SURGICAL GLOVES: Brand: PROTEXIS

## (undated) DEVICE — KENDALL SCD EXPRESS FOOT CUFF, MEDIUM: Brand: KENDALL SCD

## (undated) DEVICE — SUTURE ABSORBABLE BRAIDED 1-0 OS-8 CR 3X18 IN UD VICRYL J757T

## (undated) DEVICE — NON-ADHERENT STRIPS,OIL EMULSION: Brand: CURITY

## (undated) DEVICE — INTENDED FOR TISSUE SEPARATION, AND OTHER PROCEDURES THAT REQUIRE A SHARP SURGICAL BLADE TO PUNCTURE OR CUT.: Brand: BARD-PARKER ® CARBON RIB-BACK BLADES

## (undated) DEVICE — GAUZE SPONGES,12 PLY: Brand: CURITY

## (undated) DEVICE — GOWN,SIRUS,NONRNF,SETINSLV,XL,20/CS: Brand: MEDLINE

## (undated) DEVICE — GENESIS PIN AND DRILL SET: Brand: GENESIS

## (undated) DEVICE — GENESIS TROCHLEAR PIN 1/8 X 3: Brand: GENESIS

## (undated) DEVICE — IMMOBILIZER KNEE UNIV L19IN FOR 12-24IN THGH FOAM T BAR

## (undated) DEVICE — BOWL AND CEMENT CARTRIDGE WITH BREAKAWAY FEMORAL NOZZLE: Brand: ACM

## (undated) DEVICE — SUTURE VCRL SZ 2-0 L18IN ABSRB VLT L26MM CT-2 1/2 CIR J726D

## (undated) DEVICE — Device

## (undated) DEVICE — PACK PROCEDURE SURG TOT KNEE CUST

## (undated) DEVICE — DEVON™ KNEE AND BODY STRAP 60" X 3" (1.5 M X 7.6 CM): Brand: DEVON

## (undated) DEVICE — CONCISE CEMENT SCULPS KIT: Brand: CONCISE

## (undated) DEVICE — ZIMMER® STERILE DISPOSABLE TOURNIQUET CUFF WITH PROTECTIVE SLEEVE AND PLC, SINGLE PORT, SINGLE BLADDER, 34 IN. (86 CM)

## (undated) DEVICE — VISIONAIRE ADAPTIVE GUIDE KIT                                    GENESIS II: Brand: VISIONAIRE

## (undated) DEVICE — DRAIN KT WND 10FR RND 400ML --

## (undated) DEVICE — ABDOMINAL PAD: Brand: DERMACEA